# Patient Record
Sex: MALE | Race: WHITE | NOT HISPANIC OR LATINO | Employment: FULL TIME | URBAN - METROPOLITAN AREA
[De-identification: names, ages, dates, MRNs, and addresses within clinical notes are randomized per-mention and may not be internally consistent; named-entity substitution may affect disease eponyms.]

---

## 2020-12-31 PROCEDURE — 99204 OFFICE O/P NEW MOD 45 MIN: CPT | Performed by: EMERGENCY MEDICINE

## 2020-12-31 PROCEDURE — 84550 ASSAY OF BLOOD/URIC ACID: CPT | Performed by: EMERGENCY MEDICINE

## 2021-03-01 ENCOUNTER — APPOINTMENT (OUTPATIENT)
Dept: MRI IMAGING | Facility: HOSPITAL | Age: 46
End: 2021-03-01

## 2021-03-01 ENCOUNTER — HOSPITAL ENCOUNTER (INPATIENT)
Facility: HOSPITAL | Age: 46
LOS: 8 days | Discharge: NURSING, REHAB OR LONG-TERM CARE | End: 2021-03-09
Attending: EMERGENCY MEDICINE | Admitting: HOSPITALIST

## 2021-03-01 ENCOUNTER — APPOINTMENT (OUTPATIENT)
Dept: CT IMAGING | Facility: HOSPITAL | Age: 46
End: 2021-03-01

## 2021-03-01 DIAGNOSIS — M51.26 LUMBAR HERNIATED DISC: ICD-10-CM

## 2021-03-01 DIAGNOSIS — S32.302A CLOSED FRACTURE OF LEFT ILIAC WING, INITIAL ENCOUNTER (HCC): ICD-10-CM

## 2021-03-01 DIAGNOSIS — S32.425A CLOSED NONDISPLACED FRACTURE OF POSTERIOR WALL OF LEFT ACETABULUM, INITIAL ENCOUNTER (HCC): Primary | ICD-10-CM

## 2021-03-01 PROBLEM — E66.9 OBESITY (BMI 30-39.9): Status: ACTIVE | Noted: 2021-03-01

## 2021-03-01 LAB
ALBUMIN SERPL-MCNC: 4.1 G/DL (ref 3.5–5.2)
ALBUMIN/GLOB SERPL: 1.2 G/DL
ALP SERPL-CCNC: 68 U/L (ref 39–117)
ALT SERPL W P-5'-P-CCNC: 33 U/L (ref 1–41)
ANION GAP SERPL CALCULATED.3IONS-SCNC: 9.9 MMOL/L (ref 5–15)
APTT PPP: 31.6 SECONDS (ref 22.7–35.4)
AST SERPL-CCNC: 24 U/L (ref 1–40)
BASOPHILS # BLD AUTO: 0.09 10*3/MM3 (ref 0–0.2)
BASOPHILS NFR BLD AUTO: 0.7 % (ref 0–1.5)
BILIRUB SERPL-MCNC: 0.4 MG/DL (ref 0–1.2)
BUN SERPL-MCNC: 14 MG/DL (ref 6–20)
BUN/CREAT SERPL: 16.5 (ref 7–25)
CALCIUM SPEC-SCNC: 9.3 MG/DL (ref 8.6–10.5)
CHLORIDE SERPL-SCNC: 100 MMOL/L (ref 98–107)
CO2 SERPL-SCNC: 26.1 MMOL/L (ref 22–29)
CREAT SERPL-MCNC: 0.85 MG/DL (ref 0.76–1.27)
DEPRECATED RDW RBC AUTO: 39.4 FL (ref 37–54)
EOSINOPHIL # BLD AUTO: 0.21 10*3/MM3 (ref 0–0.4)
EOSINOPHIL NFR BLD AUTO: 1.7 % (ref 0.3–6.2)
ERYTHROCYTE [DISTWIDTH] IN BLOOD BY AUTOMATED COUNT: 12.9 % (ref 12.3–15.4)
GFR SERPL CREATININE-BSD FRML MDRD: 98 ML/MIN/1.73
GLOBULIN UR ELPH-MCNC: 3.3 GM/DL
GLUCOSE SERPL-MCNC: 77 MG/DL (ref 65–99)
HCT VFR BLD AUTO: 45.1 % (ref 37.5–51)
HGB BLD-MCNC: 14.8 G/DL (ref 13–17.7)
HOLD SPECIMEN: NORMAL
HOLD SPECIMEN: NORMAL
IMM GRANULOCYTES # BLD AUTO: 0.07 10*3/MM3 (ref 0–0.05)
IMM GRANULOCYTES NFR BLD AUTO: 0.6 % (ref 0–0.5)
INR PPP: 1.09 (ref 0.9–1.1)
LYMPHOCYTES # BLD AUTO: 2.97 10*3/MM3 (ref 0.7–3.1)
LYMPHOCYTES NFR BLD AUTO: 24.5 % (ref 19.6–45.3)
MCH RBC QN AUTO: 28.3 PG (ref 26.6–33)
MCHC RBC AUTO-ENTMCNC: 32.8 G/DL (ref 31.5–35.7)
MCV RBC AUTO: 86.2 FL (ref 79–97)
MONOCYTES # BLD AUTO: 0.94 10*3/MM3 (ref 0.1–0.9)
MONOCYTES NFR BLD AUTO: 7.8 % (ref 5–12)
NEUTROPHILS NFR BLD AUTO: 64.7 % (ref 42.7–76)
NEUTROPHILS NFR BLD AUTO: 7.84 10*3/MM3 (ref 1.7–7)
NRBC BLD AUTO-RTO: 0 /100 WBC (ref 0–0.2)
PLATELET # BLD AUTO: 339 10*3/MM3 (ref 140–450)
PMV BLD AUTO: 12.2 FL (ref 6–12)
POTASSIUM SERPL-SCNC: 4.3 MMOL/L (ref 3.5–5.2)
PROT SERPL-MCNC: 7.4 G/DL (ref 6–8.5)
PROTHROMBIN TIME: 13.9 SECONDS (ref 11.7–14.2)
RBC # BLD AUTO: 5.23 10*6/MM3 (ref 4.14–5.8)
SODIUM SERPL-SCNC: 136 MMOL/L (ref 136–145)
WBC # BLD AUTO: 12.12 10*3/MM3 (ref 3.4–10.8)
WHOLE BLOOD HOLD SPECIMEN: NORMAL
WHOLE BLOOD HOLD SPECIMEN: NORMAL

## 2021-03-01 PROCEDURE — 72192 CT PELVIS W/O DYE: CPT

## 2021-03-01 PROCEDURE — U0004 COV-19 TEST NON-CDC HGH THRU: HCPCS | Performed by: EMERGENCY MEDICINE

## 2021-03-01 PROCEDURE — 72131 CT LUMBAR SPINE W/O DYE: CPT

## 2021-03-01 PROCEDURE — 85025 COMPLETE CBC W/AUTO DIFF WBC: CPT | Performed by: EMERGENCY MEDICINE

## 2021-03-01 PROCEDURE — 85610 PROTHROMBIN TIME: CPT | Performed by: EMERGENCY MEDICINE

## 2021-03-01 PROCEDURE — 85730 THROMBOPLASTIN TIME PARTIAL: CPT | Performed by: EMERGENCY MEDICINE

## 2021-03-01 PROCEDURE — 80053 COMPREHEN METABOLIC PANEL: CPT | Performed by: EMERGENCY MEDICINE

## 2021-03-01 PROCEDURE — 99284 EMERGENCY DEPT VISIT MOD MDM: CPT

## 2021-03-01 RX ORDER — MORPHINE SULFATE 2 MG/ML
4 INJECTION, SOLUTION INTRAMUSCULAR; INTRAVENOUS ONCE AS NEEDED
Status: DISCONTINUED | OUTPATIENT
Start: 2021-03-01 | End: 2021-03-02

## 2021-03-01 RX ORDER — ONDANSETRON 2 MG/ML
4 INJECTION INTRAMUSCULAR; INTRAVENOUS EVERY 6 HOURS PRN
Status: DISCONTINUED | OUTPATIENT
Start: 2021-03-01 | End: 2021-03-09 | Stop reason: HOSPADM

## 2021-03-01 RX ORDER — SODIUM CHLORIDE 0.9 % (FLUSH) 0.9 %
10 SYRINGE (ML) INJECTION EVERY 12 HOURS SCHEDULED
Status: DISCONTINUED | OUTPATIENT
Start: 2021-03-01 | End: 2021-03-09 | Stop reason: HOSPADM

## 2021-03-01 RX ORDER — NICOTINE 21 MG/24HR
1 PATCH, TRANSDERMAL 24 HOURS TRANSDERMAL
Status: DISCONTINUED | OUTPATIENT
Start: 2021-03-01 | End: 2021-03-09 | Stop reason: HOSPADM

## 2021-03-01 RX ORDER — ACETAMINOPHEN 160 MG/5ML
650 SOLUTION ORAL EVERY 4 HOURS PRN
Status: DISCONTINUED | OUTPATIENT
Start: 2021-03-01 | End: 2021-03-09 | Stop reason: HOSPADM

## 2021-03-01 RX ORDER — MORPHINE SULFATE 2 MG/ML
4 INJECTION, SOLUTION INTRAMUSCULAR; INTRAVENOUS ONCE
Status: DISCONTINUED | OUTPATIENT
Start: 2021-03-01 | End: 2021-03-02

## 2021-03-01 RX ORDER — ACETAMINOPHEN 325 MG/1
650 TABLET ORAL EVERY 4 HOURS PRN
Status: DISCONTINUED | OUTPATIENT
Start: 2021-03-01 | End: 2021-03-09 | Stop reason: HOSPADM

## 2021-03-01 RX ORDER — ACETAMINOPHEN 500 MG
1000 TABLET ORAL ONCE
Status: COMPLETED | OUTPATIENT
Start: 2021-03-01 | End: 2021-03-01

## 2021-03-01 RX ORDER — ACETAMINOPHEN 650 MG/1
650 SUPPOSITORY RECTAL EVERY 4 HOURS PRN
Status: DISCONTINUED | OUTPATIENT
Start: 2021-03-01 | End: 2021-03-09 | Stop reason: HOSPADM

## 2021-03-01 RX ORDER — SODIUM CHLORIDE 0.9 % (FLUSH) 0.9 %
10 SYRINGE (ML) INJECTION AS NEEDED
Status: DISCONTINUED | OUTPATIENT
Start: 2021-03-01 | End: 2021-03-09 | Stop reason: HOSPADM

## 2021-03-01 RX ADMIN — ACETAMINOPHEN 1000 MG: 500 TABLET ORAL at 18:21

## 2021-03-01 NOTE — ED TRIAGE NOTES
Pt tripped and fell over a piece of concrete while at work. Complains of left lumbar back pain that is worse with movement. Patient masked at arrival and triage staff wore all appropriate PPE during entire encounter with patient.

## 2021-03-01 NOTE — ED NOTES
Pt refusing pain medication at this time. Educated to let RN know if he wants anything for pain.      Olamide Manzano, RN  03/01/21 8986

## 2021-03-01 NOTE — ED PROVIDER NOTES
EMERGENCY DEPARTMENT ENCOUNTER  Patient was placed in face mask in first look and the following protective measures were taken unless additional measures were taken and documented below in the ED course. Patient was wearing facemask when I entered the room and throughout our encounter. I wore full protective equipment throughout this patient encounter including a face mask, and gloves. Hand hygiene was performed before donning protective equipment and after removal when leaving the room.    Room Number:  28/28  Date of encounter:  3/1/2021  PCP: Provider, No Known    HPI:  Context: Festus Milton is a 45 y.o. male who presents to the ED c/o chief complaint of low back pain.  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  Patient reports his left foot got caught on a piece of rebar causing him to spin around and fall landing on the left side of his lower back.  Patient complains of severe sharp left-sided lower back pain with radiation to his anterior hip as well as occasional radiation down to his leg.  Patient complains of some intermittent tingling in his toes, no weakness.  Patient reports afterwards he tried to stand but was unable to, pain is worsened with movement, improved with rest.  Patient reports history of bulging disc in the past with similar radicular pain.  Patient reports prior to fall he was at baseline without complaint.    MEDICAL HISTORY REVIEW  Reviewed in EPIC    PAST MEDICAL HISTORY  Active Ambulatory Problems     Diagnosis Date Noted   • No Active Ambulatory Problems     Resolved Ambulatory Problems     Diagnosis Date Noted   • No Resolved Ambulatory Problems     Past Medical History:   Diagnosis Date   • Allergic    • Injury of back        PAST SURGICAL HISTORY  No past surgical history on file.    FAMILY HISTORY  No family history on file.    SOCIAL HISTORY  Social History     Socioeconomic History   • Marital status:      Spouse  name: Not on file   • Number of children: Not on file   • Years of education: Not on file   • Highest education level: Not on file   Tobacco Use   • Smoking status: Current Every Day Smoker   Substance and Sexual Activity   • Alcohol use: No   • Drug use: No       ALLERGIES  Codeine    The patient's allergies have been reviewed    REVIEW OF SYSTEMS  All systems reviewed and negative except for those discussed in HPI.     PHYSICAL EXAM  I have reviewed the triage vital signs and nursing notes.  ED Triage Vitals   Temp Heart Rate Resp BP SpO2   03/01/21 1637 03/01/21 1636 03/01/21 1636 03/01/21 1636 03/01/21 1636   97.4 °F (36.3 °C) 84 18 126/84 96 %      Temp src Heart Rate Source Patient Position BP Location FiO2 (%)   03/01/21 1637 03/01/21 1636 03/01/21 1636 -- --   Tympanic Monitor Lying         General: No acute distress  HENT: NCAT, PERRL, Nares patent  Eyes: no scleral icterus  Neck: trachea midline, no ROM limitations  CV: regular rhythm, regular rate  Respiratory: normal effort, CTAB  Abdomen: soft, nondistended, nontender to palpation, no rebound tenderness, no guarding or rigidity  : deferred  Musculoskeletal: no deformity  Lumbar spine: No step-offs or deformities, no midline tenderness, left paraspinal left lateral low back tenderness palpation.  Pelvis: Pelvis is grossly stable, patient does have some tenderness the lateral edge of the pelvis with pressure.  Positive tenderness in the left SI joint.  Patient is intolerant of leg movement, unable to assess for strength and reflexes at present other than Achilles reflexes normal, no clonus, negative Babinski.  Neuro: alert, moves all extremities, follows commands  Skin: warm, dry    LAB RESULTS  Recent Results (from the past 24 hour(s))   Light Blue Top    Collection Time: 03/01/21  4:52 PM   Result Value Ref Range    Extra Tube hold for add-on    Green Top (Gel)    Collection Time: 03/01/21  4:52 PM   Result Value Ref Range    Extra Tube Hold for  add-ons.    Lavender Top    Collection Time: 03/01/21  4:52 PM   Result Value Ref Range    Extra Tube hold for add-on    Gold Top - SST    Collection Time: 03/01/21  4:52 PM   Result Value Ref Range    Extra Tube Hold for add-ons.    Comprehensive Metabolic Panel    Collection Time: 03/01/21  4:52 PM    Specimen: Blood   Result Value Ref Range    Glucose 77 65 - 99 mg/dL    BUN 14 6 - 20 mg/dL    Creatinine 0.85 0.76 - 1.27 mg/dL    Sodium 136 136 - 145 mmol/L    Potassium 4.3 3.5 - 5.2 mmol/L    Chloride 100 98 - 107 mmol/L    CO2 26.1 22.0 - 29.0 mmol/L    Calcium 9.3 8.6 - 10.5 mg/dL    Total Protein 7.4 6.0 - 8.5 g/dL    Albumin 4.10 3.50 - 5.20 g/dL    ALT (SGPT) 33 1 - 41 U/L    AST (SGOT) 24 1 - 40 U/L    Alkaline Phosphatase 68 39 - 117 U/L    Total Bilirubin 0.4 0.0 - 1.2 mg/dL    eGFR Non African Amer 98 >60 mL/min/1.73    Globulin 3.3 gm/dL    A/G Ratio 1.2 g/dL    BUN/Creatinine Ratio 16.5 7.0 - 25.0    Anion Gap 9.9 5.0 - 15.0 mmol/L   Protime-INR    Collection Time: 03/01/21  4:52 PM    Specimen: Blood   Result Value Ref Range    Protime 13.9 11.7 - 14.2 Seconds    INR 1.09 0.90 - 1.10   aPTT    Collection Time: 03/01/21  4:52 PM    Specimen: Blood   Result Value Ref Range    PTT 31.6 22.7 - 35.4 seconds   CBC Auto Differential    Collection Time: 03/01/21  4:52 PM    Specimen: Blood   Result Value Ref Range    WBC 12.12 (H) 3.40 - 10.80 10*3/mm3    RBC 5.23 4.14 - 5.80 10*6/mm3    Hemoglobin 14.8 13.0 - 17.7 g/dL    Hematocrit 45.1 37.5 - 51.0 %    MCV 86.2 79.0 - 97.0 fL    MCH 28.3 26.6 - 33.0 pg    MCHC 32.8 31.5 - 35.7 g/dL    RDW 12.9 12.3 - 15.4 %    RDW-SD 39.4 37.0 - 54.0 fl    MPV 12.2 (H) 6.0 - 12.0 fL    Platelets 339 140 - 450 10*3/mm3    Neutrophil % 64.7 42.7 - 76.0 %    Lymphocyte % 24.5 19.6 - 45.3 %    Monocyte % 7.8 5.0 - 12.0 %    Eosinophil % 1.7 0.3 - 6.2 %    Basophil % 0.7 0.0 - 1.5 %    Immature Grans % 0.6 (H) 0.0 - 0.5 %    Neutrophils, Absolute 7.84 (H) 1.70 - 7.00 10*3/mm3     Lymphocytes, Absolute 2.97 0.70 - 3.10 10*3/mm3    Monocytes, Absolute 0.94 (H) 0.10 - 0.90 10*3/mm3    Eosinophils, Absolute 0.21 0.00 - 0.40 10*3/mm3    Basophils, Absolute 0.09 0.00 - 0.20 10*3/mm3    Immature Grans, Absolute 0.07 (H) 0.00 - 0.05 10*3/mm3    nRBC 0.0 0.0 - 0.2 /100 WBC       I ordered the above labs and reviewed the results.    RADIOLOGY  Ct Lumbar Spine Without Contrast    Result Date: 3/1/2021  CT SCAN OF THE LUMBAR SPINE WITHOUT CONTRAST  CLINICAL HISTORY: Fall with left-sided low back pain. Left-sided radicular pain.  TECHNIQUE: CT scan of the lumbar spine was obtained with 1 mm axial images. Sagittal and coronal reconstructed images were obtained.  FINDINGS: The study is somewhat compromised by technical considerations regarding the patient's body habitus.  There is no CT evidence to suggest acute fracture or bony malalignment within the lumbar spine.   At T10-11, T11-12, T12-L1, L1-2, and L2-3, there is no significant acquired canal or foraminal stenosis.  The spinal canal is relatively narrow on a congenital basis with shortened pedicles mildly narrowing the spinal canal from L2-3 down to L4-5.  At L3-4, in addition to the somewhat narrow spinal canal on a congenital basis, there is a disc osteophyte complex which results in a moderate degree of central canal stenosis. There is also a moderate degree of bilateral foraminal narrowing secondary to the disc osteophyte complex.  At L4-5, there is a disc bulge in addition to a left central/left subarticular disc protrusion which is within the position of causing mass effect upon the descending left L5 nerve root. There is mild-to-moderate bilateral foraminal narrowing secondary to bulging disc material.  At L5-S1, there is degenerative retrolisthesis of L5 on S1 by approximately 3 mm. There is mild bilateral foraminal narrowing secondary to bulging disc material.  Incidental note is made of nonobstructing intracalyceal calculi within both  kidneys.      The study is somewhat compromised by technical considerations regarding patient body habitus.  At L4-5, there is a left central/left subarticular disc protrusion which is within the position of causing mass effect upon the descending left L5 nerve root sleeve.  The spinal canal is relatively narrow on a congenital basis with somewhat shortened pedicles contributing to some canal narrowing from L2-3 down to L4-5. Additionally, there is a disc osteophyte complex at the L3-4 level and these findings result in a moderate degree of canal stenosis at L3-4 in addition to moderate bilateral foraminal narrowing at the L3-4 level.  The additional multilevel degenerative phenomena within the lumbar spine are as discussed in detail above. Given the constraints of the quality of the exam due to the patient's body habitus, further evaluation of these abnormalities could be performed with MR imaging for more sensitive and specific assessment of spine pathology. These findings and recommendations were discussed with Min Glynn on 03/01/2021 at approximately 7 PM.  Radiation dose reduction techniques were utilized, including automated exposure control and exposure modulation based on body size.       Ct Pelvis Without Contrast    Result Date: 3/1/2021  CT SCAN OF THE PELVIS WITHOUT CONTRAST  HISTORY: Fell with left-sided pain and tingling.  TECHNIQUE: The CT scan was performed as an emergency procedure through the pelvis with thin axial images combined with coronal and sagittal reconstructions.  FINDINGS: The following findings are present: 1. There appears to be minimal nondisplaced fracture predominately extending through the posterolateral margin of the left acetabulum. There is also an extremely minimal fracture through the posterior margin of the left iliac bone just inferior to the sacroiliac joint. No other fractures are identified. The proximal femurs are intact. 2. The soft tissue structures of the pelvis  appear normal. The urinary bladder has a smooth contour.    Radiation dose reduction techniques were utilized, including automated exposure control and exposure modulation based on body size.  This report was finalized on 3/1/2021 7:24 PM by Dr. Celio Calderon M.D.        I ordered the above noted radiological studies. I reviewed the images and results. I agree with the radiologist interpretation.    PROCEDURES  Procedures    MEDICATIONS GIVEN IN ER  Medications   morphine injection 4 mg (has no administration in time range)   morphine injection 4 mg (has no administration in time range)   acetaminophen (TYLENOL) tablet 1,000 mg (1,000 mg Oral Given 3/1/21 1821)       PROGRESS, DATA ANALYSIS, CONSULTS, AND MEDICAL DECISION MAKING  A complete history and physical exam have been performed.  All available laboratory and imaging results have been reviewed by myself prior to disposition.    MDM  After the initial H&P, I discussed pertinent information from history and physical exam with patient/family.  Discussed differential diagnosis.  Discussed plan for ED evaluation/work-up/treatment.  All questions answered.  Patient/family is agreeable with plan.  ED Course as of Mar 01 2023   Mon Mar 01, 2021   1759 Patient reassessed, patient is refusing any IV pain medication at this time, refuses any narcotics.  Patient is agreeable for Tylenol, will order 1000 mg p.o. Tylenol.    [JG]   0922 Phone call with radiologist.  I had previously reviewed the images. I discussed the patient, imaging, and their interpretation.  CT imaging of pelvis shows a small hairline posterior lateral acetabular fracture as well as a minimal, hairline fracture of the posterior left iliac bone, no sacroiliac involvement.  See dictated report for final interpretation.        [JG]   1900 Phone call with radiologist.  I had previously reviewed the images. I discussed the patient, imaging, and their interpretation.  CT imaging of the spine shows  questionable disc herniation at L4-5.  No fractures or dislocation seen.  See dictated report for final interpretation.        [JG]   1907 Patient reassessed, discussed ED work-up and results to present.  Discussed finding of likely disc herniation as well as pelvic fractures which are likely nonoperative.  Discussed plan for follow-up with neurosurgery and spine.  Discussed with patient that we will need to ambulate him to ensure that he can ambulate prior to discharge.  Patient is agreeable with plan.    [JG]   1908 MACARIO query complete. Treatment plan to include limited course of prescribed  controlled substance. Risks including addiction, benefits, and alternatives presented to patient.       [JG]   1927 Patient attempted to ambulate, even with assistance he was grossly unstable.  Patient was taken back to bed, no fall or trauma.  Patient reassessed, patient is now agreeable for pain medication, will give pain medication and reassess.    [JG]   1932 Phone call with Dr. Ernesto Loyola, orthopedics.  Discussed the patient, relevant history, exam, diagnostics, ED findings/progress, and concerns.  He has reviewed imaging, states that with patient's acetabular fracture he will not be able to bear weight, will be toe-touch weightbearing only.  He recommends admission to hospitalist for pain control, physical therapy, patient will likely need rehab afterwards.        [JG]   1936 Patient reassessed.  Discussed ED findings, differential diagnosis, and the need for admission for evaluation/treatment.  They are agreeable to admission and all questions were answered.        [JG]   1948 Phone call with Dr. Huerta, neurosurgery.  Discussed the patient, relevant history, exam, diagnostics, ED findings/progress, and concerns.  He agrees to consult, request MRI of lumbar spine to be performed.        [JG]   2001 Phone call with DA Alonso for A.  Discussed the patient, relevant history, exam, diagnostics, ED findings/progress,  and concerns. They agree to admit the patient to [facility] under Dr. Guillermo. Care assumed by the admitting physician at this time.        [JG]      ED Course User Index  [JG] Min Glynn MD       AS OF 20:23 EST VITALS:    BP - 137/81  HR - 72  TEMP - 97.4 °F (36.3 °C) (Tympanic)  O2 SATS - 96%    DIAGNOSIS  Final diagnoses:   Closed nondisplaced fracture of posterior wall of left acetabulum, initial encounter (CMS/[facility])   Closed fracture of left iliac wing, initial encounter (CMS/[facility])   Lumbar herniated disc         DISPOSITION  ADMISSION    Discussed treatment plan and reason for admission with pt/family and admitting physician.  Pt/family voiced understanding of the plan for admission for further testing/treatment as needed.          Min Glynn MD  03/01/21 2004       Min Glynn MD  03/01/21 2024

## 2021-03-02 LAB
ANION GAP SERPL CALCULATED.3IONS-SCNC: 10 MMOL/L (ref 5–15)
BUN SERPL-MCNC: 12 MG/DL (ref 6–20)
BUN/CREAT SERPL: 16.2 (ref 7–25)
CALCIUM SPEC-SCNC: 9.1 MG/DL (ref 8.6–10.5)
CHLORIDE SERPL-SCNC: 101 MMOL/L (ref 98–107)
CO2 SERPL-SCNC: 24 MMOL/L (ref 22–29)
CREAT SERPL-MCNC: 0.74 MG/DL (ref 0.76–1.27)
DEPRECATED RDW RBC AUTO: 40.9 FL (ref 37–54)
ERYTHROCYTE [DISTWIDTH] IN BLOOD BY AUTOMATED COUNT: 12.9 % (ref 12.3–15.4)
GFR SERPL CREATININE-BSD FRML MDRD: 115 ML/MIN/1.73
GLUCOSE SERPL-MCNC: 100 MG/DL (ref 65–99)
HCT VFR BLD AUTO: 45.5 % (ref 37.5–51)
HGB BLD-MCNC: 14.2 G/DL (ref 13–17.7)
MCH RBC QN AUTO: 27.4 PG (ref 26.6–33)
MCHC RBC AUTO-ENTMCNC: 31.2 G/DL (ref 31.5–35.7)
MCV RBC AUTO: 87.7 FL (ref 79–97)
PLATELET # BLD AUTO: 339 10*3/MM3 (ref 140–450)
PMV BLD AUTO: 12.2 FL (ref 6–12)
POTASSIUM SERPL-SCNC: 4 MMOL/L (ref 3.5–5.2)
RBC # BLD AUTO: 5.19 10*6/MM3 (ref 4.14–5.8)
SARS-COV-2 ORF1AB RESP QL NAA+PROBE: NOT DETECTED
SODIUM SERPL-SCNC: 135 MMOL/L (ref 136–145)
WBC # BLD AUTO: 9.59 10*3/MM3 (ref 3.4–10.8)

## 2021-03-02 PROCEDURE — 25010000002 ENOXAPARIN PER 10 MG: Performed by: STUDENT IN AN ORGANIZED HEALTH CARE EDUCATION/TRAINING PROGRAM

## 2021-03-02 PROCEDURE — 80048 BASIC METABOLIC PNL TOTAL CA: CPT | Performed by: NURSE PRACTITIONER

## 2021-03-02 PROCEDURE — 85027 COMPLETE CBC AUTOMATED: CPT | Performed by: NURSE PRACTITIONER

## 2021-03-02 PROCEDURE — 97162 PT EVAL MOD COMPLEX 30 MIN: CPT

## 2021-03-02 PROCEDURE — 99222 1ST HOSP IP/OBS MODERATE 55: CPT | Performed by: NURSE PRACTITIONER

## 2021-03-02 PROCEDURE — 97110 THERAPEUTIC EXERCISES: CPT

## 2021-03-02 PROCEDURE — 36415 COLL VENOUS BLD VENIPUNCTURE: CPT | Performed by: NURSE PRACTITIONER

## 2021-03-02 RX ORDER — MORPHINE SULFATE 2 MG/ML
2 INJECTION, SOLUTION INTRAMUSCULAR; INTRAVENOUS
Status: ACTIVE | OUTPATIENT
Start: 2021-03-02 | End: 2021-03-09

## 2021-03-02 RX ORDER — HYDROCODONE BITARTRATE AND ACETAMINOPHEN 5; 325 MG/1; MG/1
1 TABLET ORAL EVERY 4 HOURS PRN
Status: DISCONTINUED | OUTPATIENT
Start: 2021-03-02 | End: 2021-03-03

## 2021-03-02 RX ADMIN — ENOXAPARIN SODIUM 40 MG: 40 INJECTION SUBCUTANEOUS at 10:04

## 2021-03-02 RX ADMIN — SODIUM CHLORIDE, PRESERVATIVE FREE 10 ML: 5 INJECTION INTRAVENOUS at 10:04

## 2021-03-02 RX ADMIN — Medication 1 PATCH: at 00:21

## 2021-03-02 RX ADMIN — HYDROCODONE BITARTRATE AND ACETAMINOPHEN 1 TABLET: 5; 325 TABLET ORAL at 10:04

## 2021-03-02 RX ADMIN — SODIUM CHLORIDE, PRESERVATIVE FREE 10 ML: 5 INJECTION INTRAVENOUS at 20:12

## 2021-03-02 RX ADMIN — HYDROCODONE BITARTRATE AND ACETAMINOPHEN 1 TABLET: 5; 325 TABLET ORAL at 18:54

## 2021-03-02 RX ADMIN — ACETAMINOPHEN 650 MG: 325 TABLET, FILM COATED ORAL at 10:04

## 2021-03-02 RX ADMIN — SODIUM CHLORIDE, PRESERVATIVE FREE 10 ML: 5 INJECTION INTRAVENOUS at 00:22

## 2021-03-02 RX ADMIN — HYDROCODONE BITARTRATE AND ACETAMINOPHEN 1 TABLET: 5; 325 TABLET ORAL at 14:50

## 2021-03-02 RX ADMIN — ENOXAPARIN SODIUM 40 MG: 40 INJECTION SUBCUTANEOUS at 20:12

## 2021-03-02 NOTE — ED NOTES
Pt was able to ambulate 2 steps in room then had to stop because of pain. Able to stand to use urinal.      Olamide Manzano RN  03/01/21 1924

## 2021-03-02 NOTE — PROGRESS NOTES
" LOS: 1 day     Name: Festus Milton  Age: 45 y.o.  Sex: male  :  1975  MRN: [id number]         Primary Care Physician: Provider, No Known    Subjective   Subjective  Complains of pain in his left hip.  Has only taken Tylenol since admission and not currently well controlled.  Has significant pain if he tries to move around in the bed at all.    Objective   Vital Signs  Temp:  [96.8 °F (36 °C)-97.6 °F (36.4 °C)] 96.8 °F (36 °C)  Heart Rate:  [68-84] 74  Resp:  [14-20] 16  BP: (124-161)/(78-97) 139/84  Body mass index is 50.22 kg/m².    Objective:  General Appearance:  Comfortable and in no acute distress.    Vital signs: (most recent): Blood pressure 139/84, pulse 74, temperature 96.8 °F (36 °C), temperature source Oral, resp. rate 16, height 177.8 cm (70\"), weight (!) 159 kg (350 lb), SpO2 95 %.    Lungs:  Normal effort and normal respiratory rate.    Heart: Normal rate.  Regular rhythm.    Abdomen: Abdomen is soft.  Bowel sounds are normal.   There is no abdominal tenderness.     Extremities: There is no dependent edema or local swelling.    Neurological: Patient is alert and oriented to person, place and time.    Skin:  Warm and dry.              Results Review:       I reviewed the patient's new clinical results.    Results from last 7 days   Lab Units 21  0608 21  1652   WBC 10*3/mm3 9.59 12.12*   HEMOGLOBIN g/dL 14.2 14.8   PLATELETS 10*3/mm3 339 339     Results from last 7 days   Lab Units 21  0608 21  1652   SODIUM mmol/L 135* 136   POTASSIUM mmol/L 4.0 4.3   CHLORIDE mmol/L 101 100   CO2 mmol/L 24.0 26.1   BUN mg/dL 12 14   CREATININE mg/dL 0.74* 0.85   CALCIUM mg/dL 9.1 9.3   GLUCOSE mg/dL 100* 77     Results from last 7 days   Lab Units 21  1652   INR  1.09             Scheduled Meds:   enoxaparin, 40 mg, Subcutaneous, Q12H  nicotine, 1 patch, Transdermal, Q24H  sodium chloride, 10 mL, Intravenous, Q12H      PRN Meds:   •  acetaminophen **OR** acetaminophen **OR** " acetaminophen  •  HYDROcodone-acetaminophen  •  Morphine  •  ondansetron  •  Pharmacy to Dose enoxaparin (LOVENOX)  •  sodium chloride  Continuous Infusions:  Pharmacy to Dose enoxaparin (LOVENOX),         Assessment/Plan   Active Hospital Problems    Diagnosis  POA   • **Closed nondisplaced fracture of posterior wall of left acetabulum (CMS/HCC) [S32.425A]  Yes   • Lumbar herniated disc [M51.26]  Yes   • Obesity (BMI 30-39.9) [E66.9]  Yes      Resolved Hospital Problems   No resolved problems to display.       Assessment & Plan    -Orthopedic surgery and neurosurgery have been consulted for recommendations regarding the acetabular fracture and herniated lumbar disc, respectively.  -Pain control with combination of oral and IV.  -Leukocytosis yesterday was reactive and now resolved  -PT. Weightbearing restrictions per surgical services  -Lovenox for DVT prophylaxis    Dispo  To be determined.  CCP to assist.  May need rehab.    I wore full protective equipment throughout the patient encounter including eye protection and facemask.  Hand hygiene was performed before donning protective equipment and after removal when leaving the room.    Jared Rico MD  [facility]  03/02/21  09:48 EST

## 2021-03-02 NOTE — PROGRESS NOTES
"Pharmacy Consult - Lovenox    [facility] has been consulted for pharmacy to dose enoxaparin for VTE prophylaxis per Dr. Guillermo's request.         Relevant clinical data and objective history reviewed:  45 y.o. male 177.8 cm (70\") (!) 159 kg (350 lb)    Home Anticoagulation: None listed on PTA med list    Past Medical History:   Diagnosis Date    Allergic     Injury of back      is allergic to codeine.    Lab Results   Component Value Date    WBC 12.12 (H) 03/01/2021    HGB 14.8 03/01/2021    HCT 45.1 03/01/2021    MCV 86.2 03/01/2021     03/01/2021     Lab Results   Component Value Date    GLUCOSE 77 03/01/2021    CALCIUM 9.3 03/01/2021     03/01/2021    K 4.3 03/01/2021    CO2 26.1 03/01/2021     03/01/2021    BUN 14 03/01/2021    CREATININE 0.85 03/01/2021    EGFRIFNONA 98 03/01/2021    BCR 16.5 03/01/2021    ANIONGAP 9.9 03/01/2021       Estimated Creatinine Clearance: 167.8 mL/min (by C-G formula based on SCr of 0.85 mg/dL).    Active Inpatient Anticoagulation Orders:     Assessment/Plan    Will start patient on Lovenox 40 mg subcutaneous every 12 hours, adjusted for renal function, BMI>40kg/m2. Labs to be ordered: BMP/CBC in AM. Pharmacy will continue to follow.     Kenn Milton, [facility]    "

## 2021-03-02 NOTE — PROGRESS NOTES
Discharge Planning Assessment  [facility]     Patient Name: Festus Milton  MRN: [id number]  Today's Date: 3/2/2021    Admit Date: 3/1/2021    Discharge Needs Assessment     Row Name 03/02/21 0903       Living Environment    Lives With  spouse    Current Living Arrangements  home/apartment/condo    Potentially Unsafe Housing Conditions  other (see comments) no concerns    Primary Care Provided by  self    Provides Primary Care For  no one    Family Caregiver if Needed  spouse    Quality of Family Relationships  helpful;involved;supportive       Resource/Environmental Concerns    Resource/Environmental Concerns  none    Transportation Concerns  car, none       Transition Planning    Patient/Family Anticipates Transition to  inpatient rehabilitation facility    Patient/Family Anticipated Services at Transition  rehabilitation services    Transportation Anticipated  family or friend will provide       Discharge Needs Assessment    Readmission Within the Last 30 Days  no previous admission in last 30 days    Current Outpatient/Agency/Support Group  skilled nursing facility    Equipment Currently Used at Home  none    Concerns to be Addressed  discharge planning    Anticipated Changes Related to Illness  none    Equipment Needed After Discharge  none    Outpatient/Agency/Support Group Needs  skilled nursing facility    Discharge Facility/Level of Care Needs  nursing facility, skilled        Discharge Plan     Row Name 03/02/21 0903       Plan    Plan  Pending SNF referrals    Patient/Family in Agreement with Plan  yes    Plan Comments  CCP met with patient at bedside. CCP role explained and discharge planning discussed. Face sheet verified. Patient lives with his spouse, with two steps to the entrance of the home. Patient’s bedroom is upstairs (13 steps-handrails present). Patient does have a pull out couch on the main level he could utilize if needed. Patient is independent with his ADLs prior to admission. Patient has  not been to SNF or used home health. Patient’s preferred [facility] is [facility] on [address]. CCP discussed possible SNF at discharge. CCP reviewed SNF list with patient, patient agreeable to referrals to [facility]. CCP provided Medicare quality ratings to patient. CCP will follow for PT eval and follow for pending SNF referrals. Gloria VALENTIN        Continued Care and Services - Admitted Since 3/1/2021    Coordination has not been started for this encounter.         Demographic Summary     Row Name 03/02/21 0902       General Information    Admission Type  inpatient    Arrived From  emergency department    Referral Source  admission list    Reason for Consult  discharge planning    Preferred Language  English     Used During This Interaction  no        Functional Status     Row Name 03/02/21 0902       Functional Status    Usual Activity Tolerance  good    Current Activity Tolerance  fair       Functional Status, IADL    Medications  independent    Meal Preparation  independent    Housekeeping  independent    Laundry  independent    Shopping  independent       Mental Status    General Appearance WDL  WDL       Mental Status Summary    Recent Changes in Mental Status/Cognitive Functioning  no changes       Employment/    Employment Status  employed full-time        Psychosocial    No documentation.       Abuse/Neglect    No documentation.       Legal    No documentation.       Substance Abuse    No documentation.       Patient Forms    No documentation.           BARBIE Coello

## 2021-03-02 NOTE — PLAN OF CARE
Goal Outcome Evaluation:  Plan of Care Reviewed With: patient     Outcome Summary: Pt. is a 45 year old Male admitted to the hospital after a fall sustaining a Left Acetabular fx/Left Posterior Iliac fx. Pt. is now TTWB LLE per MD orders.  Pt. reports that prior to admission he was independent with functional mobility and no use of an A.D. during ambulation. Pt. currently presents with decreased balance and decreased tolerance to functional activity. Pt. will benefit from skilled inpt. P.T. to address his functional deficits and to assist pt. in regaining his maximum level of independence with functional mobility.    Patient was wearing a face mask during this therapy encounter. Therapist used appropriate personal protective equipment including eye protection, mask, and gloves.  Mask used was standard procedure mask. Appropriate PPE was worn during the entire therapy session. Hand hygiene was completed before and after therapy session. Patient is not in enhanced droplet precautions.

## 2021-03-02 NOTE — CONSULTS
"  Orthopaedic Surgery  Consult Note  Dr. TORREY Meneses” Nir II  [phone]    HPI:  Patient is a 45 y.o. Not  or  male who presents with left hip pain after a mechanical fall while at work.  He presented to the hospital where a CT scan demonstrated a left nondisplaced posterior wall acetabular fracture.  He was made toe-touch weightbearing and admitted for pain control and mobilization.  Patient has a BMI over 50 and this does complicate his mobilization efforts.  He complains of sharp pains with any attempted range of motion or weight on the left leg.    MEDICAL HISTORY  Past Medical History:   Diagnosis Date   • Allergic    • Injury of back    ·   · No past surgical history on file.  Prior to Admission medications    Not on File   ·   Allergies   Allergen Reactions   • Codeine Provider Review Needed   ·   ·   · There is no immunization history on file for this patient.  Social History     Tobacco Use   • Smoking status: Current Every Day Smoker   Substance Use Topics   • Alcohol use: No   ·    Social History     Substance and Sexual Activity   Drug Use No   ·     VITALS: /84 (BP Location: Left arm, Patient Position: Lying)   Pulse 74   Temp 96.8 °F (36 °C) (Oral)   Resp 16   Ht 177.8 cm (70\")   Wt (!) 159 kg (350 lb)   SpO2 95%   BMI 50.22 kg/m²  Body mass index is 50.22 kg/m².    PHYSICAL EXAM:   · CONSTITUTIONAL: No acute distress  · LUNGS: Equal chest rise, no shortness of air  · CARDIOVASCULAR: palpable peripheral pulses  · SKIN: no skin lesions in the area examined  · LYMPH: no lymphadenopathy in the area examined  · EXTREMITY: Left Lower Extremity  · Tenderness to Palpation: Pain with passive leg roll  · Gross Deformity: No Gross Deformity  · Pulses:  Brisk Capillary Refill  · Sensation: Intact to Saphenous, Sural, Deep Peroneal, Superficial Peroneal, and Tibial Nerves and grossly throughout extremity  · Motor: 5/5 EHL/FHL/TA/GS motor complexes    RADIOLOGY REVIEW:   Ct Lumbar Spine " Without Contrast    Result Date: 3/2/2021  The study is somewhat compromised by technical considerations regarding patient body habitus.  At L4-5, there is a left central/left subarticular disc protrusion which is within the position of causing mass effect upon the descending left L5 nerve root sleeve.  The spinal canal is relatively narrow on a congenital basis with somewhat shortened pedicles contributing to some canal narrowing from L2-3 down to L4-5. Additionally, there is a disc osteophyte complex at the L3-4 level and these findings result in a moderate degree of canal stenosis at L3-4 in addition to moderate bilateral foraminal narrowing at the L3-4 level.  The additional multilevel degenerative phenomena within the lumbar spine are as discussed in detail above. Given the constraints of the quality of the exam due to the patient's body habitus, further evaluation of these abnormalities could be performed with MR imaging for more sensitive and specific assessment of spine pathology. These findings and recommendations were discussed with Min Glynn on 03/01/2021 at approximately 7 PM.  Radiation dose reduction techniques were utilized, including automated exposure control and exposure modulation based on body size.  This report was finalized on 3/2/2021 6:20 AM by Dr. Lawrence Latham M.D.        LABS:   Results for the past 24 hours:   Recent Results (from the past 24 hour(s))   Light Blue Top    Collection Time: 03/01/21  4:52 PM   Result Value Ref Range    Extra Tube hold for add-on    Green Top (Gel)    Collection Time: 03/01/21  4:52 PM   Result Value Ref Range    Extra Tube Hold for add-ons.    Lavender Top    Collection Time: 03/01/21  4:52 PM   Result Value Ref Range    Extra Tube hold for add-on    Gold Top - SST    Collection Time: 03/01/21  4:52 PM   Result Value Ref Range    Extra Tube Hold for add-ons.    Comprehensive Metabolic Panel    Collection Time: 03/01/21  4:52 PM    Specimen: Blood      Result Value Ref Range    Glucose 77 65 - 99 mg/dL    BUN 14 6 - 20 mg/dL    Creatinine 0.85 0.76 - 1.27 mg/dL    Sodium 136 136 - 145 mmol/L    Potassium 4.3 3.5 - 5.2 mmol/L    Chloride 100 98 - 107 mmol/L    CO2 26.1 22.0 - 29.0 mmol/L    Calcium 9.3 8.6 - 10.5 mg/dL    Total Protein 7.4 6.0 - 8.5 g/dL    Albumin 4.10 3.50 - 5.20 g/dL    ALT (SGPT) 33 1 - 41 U/L    AST (SGOT) 24 1 - 40 U/L    Alkaline Phosphatase 68 39 - 117 U/L    Total Bilirubin 0.4 0.0 - 1.2 mg/dL    eGFR Non African Amer 98 >60 mL/min/1.73    Globulin 3.3 gm/dL    A/G Ratio 1.2 g/dL    BUN/Creatinine Ratio 16.5 7.0 - 25.0    Anion Gap 9.9 5.0 - 15.0 mmol/L   Protime-INR    Collection Time: 03/01/21  4:52 PM    Specimen: Blood   Result Value Ref Range    Protime 13.9 11.7 - 14.2 Seconds    INR 1.09 0.90 - 1.10   aPTT    Collection Time: 03/01/21  4:52 PM    Specimen: Blood   Result Value Ref Range    PTT 31.6 22.7 - 35.4 seconds   CBC Auto Differential    Collection Time: 03/01/21  4:52 PM    Specimen: Blood   Result Value Ref Range    WBC 12.12 (H) 3.40 - 10.80 10*3/mm3    RBC 5.23 4.14 - 5.80 10*6/mm3    Hemoglobin 14.8 13.0 - 17.7 g/dL    Hematocrit 45.1 37.5 - 51.0 %    MCV 86.2 79.0 - 97.0 fL    MCH 28.3 26.6 - 33.0 pg    MCHC 32.8 31.5 - 35.7 g/dL    RDW 12.9 12.3 - 15.4 %    RDW-SD 39.4 37.0 - 54.0 fl    MPV 12.2 (H) 6.0 - 12.0 fL    Platelets 339 140 - 450 10*3/mm3    Neutrophil % 64.7 42.7 - 76.0 %    Lymphocyte % 24.5 19.6 - 45.3 %    Monocyte % 7.8 5.0 - 12.0 %    Eosinophil % 1.7 0.3 - 6.2 %    Basophil % 0.7 0.0 - 1.5 %    Immature Grans % 0.6 (H) 0.0 - 0.5 %    Neutrophils, Absolute 7.84 (H) 1.70 - 7.00 10*3/mm3    Lymphocytes, Absolute 2.97 0.70 - 3.10 10*3/mm3    Monocytes, Absolute 0.94 (H) 0.10 - 0.90 10*3/mm3    Eosinophils, Absolute 0.21 0.00 - 0.40 10*3/mm3    Basophils, Absolute 0.09 0.00 - 0.20 10*3/mm3    Immature Grans, Absolute 0.07 (H) 0.00 - 0.05 10*3/mm3    nRBC 0.0 0.0 - 0.2 /100 WBC   COVID-19,[facility]  [facility], NP/OP SWAB IN UTM/VTM/[facility] TRANSPORT MEDIA,24 HR TAT - Swab, Nasopharynx    Collection Time: 03/01/21  7:58 PM    Specimen: Nasopharynx; Swab   Result Value Ref Range    COVID19 Not Detected Not Detected - Ref. Range   Basic Metabolic Panel    Collection Time: 03/02/21  6:08 AM    Specimen: Arm, Left; Blood   Result Value Ref Range    Glucose 100 (H) 65 - 99 mg/dL    BUN 12 6 - 20 mg/dL    Creatinine 0.74 (L) 0.76 - 1.27 mg/dL    Sodium 135 (L) 136 - 145 mmol/L    Potassium 4.0 3.5 - 5.2 mmol/L    Chloride 101 98 - 107 mmol/L    CO2 24.0 22.0 - 29.0 mmol/L    Calcium 9.1 8.6 - 10.5 mg/dL    eGFR Non African Amer 115 >60 mL/min/1.73    BUN/Creatinine Ratio 16.2 7.0 - 25.0    Anion Gap 10.0 5.0 - 15.0 mmol/L   CBC (No Diff)    Collection Time: 03/02/21  6:08 AM    Specimen: Arm, Left; Blood   Result Value Ref Range    WBC 9.59 3.40 - 10.80 10*3/mm3    RBC 5.19 4.14 - 5.80 10*6/mm3    Hemoglobin 14.2 13.0 - 17.7 g/dL    Hematocrit 45.5 37.5 - 51.0 %    MCV 87.7 79.0 - 97.0 fL    MCH 27.4 26.6 - 33.0 pg    MCHC 31.2 (L) 31.5 - 35.7 g/dL    RDW 12.9 12.3 - 15.4 %    RDW-SD 40.9 37.0 - 54.0 fl    MPV 12.2 (H) 6.0 - 12.0 fL    Platelets 339 140 - 450 10*3/mm3       IMPRESSION:  Patient is a 45 y.o. Not  or  male with left posterior wall acetabular fracture, nondisplaced    PLAN:   · Admited to: Jake Guillermo MD  · Disposition: Plan nonoperative treatment of this injury.  Toe-touch weightbearing for 6 weeks followed by partial weightbearing for 6 weeks.  Physical therapy for mobilization.  Based on his BMI he very well may need rehab placement.  We will see how he does with therapy first.    R \"Ernesto\" Nir FERNANDEZ MD  Orthopaedic Surgery  [facility]  [phone] - [place] [facility]  [phone] - [place] [facility]            "

## 2021-03-02 NOTE — PROGRESS NOTES
"Pharmacy to dose Lovenox   Dx: Pt admitted with acetabular fracture  Indication: VTE prophylaxis  Requesting MD: Dr. Guillermo  Current dose: 40 mg BID    Relevant data:  Blood pressure 126/90, pulse 78, temperature 97.3 °F (36.3 °C), temperature source Oral, resp. rate 16, height 177.8 cm (70\"), weight (!) 159 kg (350 lb), SpO2 96 %.    45 y.o. male Body mass index is 50.22 kg/m².    Results from last 7 days   Lab Units 03/02/21  0608 03/01/21  1652   CREATININE mg/dL 0.74* 0.85       Results from last 7 days   Lab Units 03/02/21  0608 03/01/21  1652   WBC 10*3/mm3 9.59 12.12*   HEMOGLOBIN g/dL 14.2 14.8   HEMATOCRIT % 45.5 45.1   PLATELETS 10*3/mm3 339 339       Lab Results   Component Value Date    INR 1.09 03/01/2021       Assessment:  Pt appropriately dosed on 40 mg Q12 for VTE prophylaxis in a patient with BMI >40. Plt, Scr and Hbg stable. Will sign off at this time given that no further adjustments in dosing are expected. Pharmacy will continue to review as part of clinical program, however do not hesitate to reach out for further questions or assistance if needed.     Sydni Noyola, Pharm.D., [facility]  Oncology Pharmacy Specialist  [phone]        "

## 2021-03-02 NOTE — PROGRESS NOTES
Continued Stay Note  [facility]     Patient Name: Festus Milton  MRN: [id number]  Today's Date: 3/2/2021    Admit Date: 3/1/2021    Discharge Plan     Row Name 03/02/21 0944       Plan    Plan  Pending SNF referrals    Plan Comments  CCP provided SNF medicare ratings; patient agreeable to CCP making referrals to facilities that may work with Worker's comp and have three star rating. Additional referrals placed in [facility]. Gloria VALENTIN    Row Name 03/02/21 0903       Plan    Plan  Pending SNF referrals    Patient/Family in Agreement with Plan  yes    Plan Comments  CCP met with patient at bedside. CCP role explained and discharge planning discussed. Face sheet verified. Patient lives with his spouse, with two steps to the entrance of the home. Patient’s bedroom is upstairs (13 steps-handrails present). Patient does have a pull out couch on the main level he could utilize if needed. Patient is independent with his ADLs prior to admission. Patient has not been to SNF or used home health. Patient’s preferred pharmacy is [facility] on [facility]. CCP discussed possible SNF at discharge. CCP reviewed SNF list with patient, patient agreeable to referrals to [facility] and [facility]. CCP provided Medicare quality ratings to patient. CCP will follow for PT eval and follow for pending SNF referrals. Gloria VALENTIN        Discharge Codes    No documentation.             BARBIE Coello

## 2021-03-02 NOTE — THERAPY EVALUATION
Patient Name: Festus Milton  : 1975    MRN: [id number]                              Today's Date: 3/2/2021       Admit Date: 3/1/2021    Visit Dx:     ICD-10-CM ICD-9-CM   1. Closed nondisplaced fracture of posterior wall of left acetabulum, initial encounter (CMS/[facility])  S32.425A 808.0   2. Closed fracture of left iliac wing, initial encounter (CMS/[facility])  S32.302A 808.41   3. Lumbar herniated disc  M51.26 722.10     Patient Active Problem List   Diagnosis   • Closed nondisplaced fracture of posterior wall of left acetabulum (CMS/[facility])   • Lumbar herniated disc   • Obesity (BMI 30-39.9)     Past Medical History:   Diagnosis Date   • Allergic    • Injury of back      No past surgical history on file.  General Information     Row Name 21 0953          Physical Therapy Time and Intention    Document Type  evaluation Pt. admitted after a fall sustaining a Left acetabular fx/Left Posterior Iliac fx.  -MS     Mode of Treatment  physical therapy;individual therapy  -MS     Row Name 21 0053          General Information    Patient Profile Reviewed  yes  -MS     Prior Level of Function  independent:  -MS     Existing Precautions/Restrictions  (S) fall;weight bearing TTWB LLE per MD orders  -MS     Barriers to Rehab  none identified  -MS     Row Name 2153          Cognition    Orientation Status (Cognition)  oriented x 3  -MS     Row Name 2153          Safety Issues, Functional Mobility    Comment, Safety Issues/Impairments (Mobility)  Gait belt used for safety.  -MS       User Key  (r) = Recorded By, (t) = Taken By, (c) = Cosigned By    Initials Name Provider Type    MS Jude Guillermo, PT Physical Therapist        Mobility     Row Name 21 0954          Bed Mobility    Bed Mobility  supine-sit;sit-supine  -MS     Supine-Sit [place] (Bed Mobility)  minimum assist (75% patient effort)  -MS     Row Name 21 0954          Transfers    Comment (Transfers)  Pt. performed sit <->  stand transfers x 3 reps for functional strength training.  -MS     Row Name 03/02/21 0954          Sit-Stand Transfer    Sit-Stand [place] (Transfers)  minimum assist (75% patient effort);2 person assist  -MS     Assistive Device (Sit-Stand Transfers)  walker, front-wheeled  -MS     Row Name 03/02/21 0954          Gait/Stairs (Locomotion)    [place] Level (Gait)  minimum assist (75% patient effort);2 person assist  -MS     Assistive Device (Gait)  walker, front-wheeled  -MS     Distance in Feet (Gait)  6 feet  -MS     Deviations/Abnormal Patterns (Gait)  antalgic;enoc decreased  -MS     Bilateral Gait Deviations  forward flexed posture  -MS     Comment (Gait/Stairs)  Pt. able to maintain TTWB LLE throughout upright mobility.  -MS     Row Name 03/02/21 0954          Mobility    Extremity Weight-bearing Status  (S) left lower extremity  -MS     Left Lower Extremity (Weight-bearing Status)  (S) toe touch weight-bearing (TTWB)  -MS       User Key  (r) = Recorded By, (t) = Taken By, (c) = Cosigned By    Initials Name Provider Type    Jude Cohen, PT Physical Therapist        Obj/Interventions     Row Name 03/02/21 0956          Range of Motion Comprehensive    Comment, General Range of Motion  BUE/RLE (WFL's)  -MS     Row Name 03/02/21 0956          Strength Comprehensive (MMT)    Comment, General Manual Muscle Testing (MMT) Assessment  BUE (4-/5); RLE (>/= 3/5)  -MS       User Key  (r) = Recorded By, (t) = Taken By, (c) = Cosigned By    Initials Name Provider Type    Jude Cohen, PT Physical Therapist        Goals/Plan     Row Name 03/02/21 0958          Bed Mobility Goal 1 (PT)    Activity/Assistive Device (Bed Mobility Goal 1, PT)  bed mobility activities, all  -MS     [place] Level/Cues Needed (Bed Mobility Goal 1, PT)  standby assist  -MS     Time Frame (Bed Mobility Goal 1, PT)  long term goal (LTG);1 week  -MS     Row Name 03/02/21 0958          Transfer Goal 1 (PT)     Activity/Assistive Device (Transfer Goal 1, PT)  transfers, all;walker, rolling  -MS     [place] Level/Cues Needed (Transfer Goal 1, PT)  standby assist  -MS     Time Frame (Transfer Goal 1, PT)  long term goal (LTG);1 week  -MS     Row Name 03/02/21 0958          Gait Training Goal 1 (PT)    Activity/Assistive Device (Gait Training Goal 1, PT)  gait (walking locomotion);walker, rolling  -MS     [place] Level (Gait Training Goal 1, PT)  standby assist  -MS     Distance (Gait Training Goal 1, PT)  20 feet  -MS     Time Frame (Gait Training Goal 1, PT)  long term goal (LTG);1 week  -MS       User Key  (r) = Recorded By, (t) = Taken By, (c) = Cosigned By    Initials Name Provider Type    Jude Cohen, PT Physical Therapist        Clinical Impression     Row Name 03/02/21 0957          Pain    Additional Documentation  Pain Scale: Numbers Pre/Post-Treatment (Group)  -MS     Row Name 03/02/21 0957          Pain Scale: Numbers Pre/Post-Treatment    Pretreatment Pain Rating  8/10  -MS     Posttreatment Pain Rating  7/10  -MS     Pain Location - Side  Left  -MS     Pain Location  hip  -MS     Pain Intervention(s)  Medication (See MAR);Rest;Repositioned  -MS     Row Name 03/02/21 0957          Plan of Care Review    Plan of Care Reviewed With  patient  -MS     Row Name 03/02/21 0957          Therapy Assessment/Plan (PT)    Rehab Potential (PT)  good, to achieve stated therapy goals  -MS     Criteria for Skilled Interventions Met (PT)  skilled treatment is necessary  -MS     Row Name 03/02/21 0957          Positioning and Restraints    Pre-Treatment Position  in bed  -MS     Post Treatment Position  chair  -MS     In Chair  notified nsg;sitting;call light within reach;encouraged to call for assist;with nsg All lines intact.  -MS       User Key  (r) = Recorded By, (t) = Taken By, (c) = Cosigned By    Initials Name Provider Type    Jude Cohen, PT Physical Therapist        Outcome Measures     Row  Name 03/02/21 0959          How much help from another person do you currently need...    Turning from your back to your side while in flat bed without using bedrails?  3  -MS     Moving from lying on back to sitting on the side of a flat bed without bedrails?  3  -MS     Moving to and from a bed to a chair (including a wheelchair)?  2  -MS     Standing up from a chair using your arms (e.g., wheelchair, bedside chair)?  2  -MS     Climbing 3-5 steps with a railing?  2  -MS     To walk in hospital room?  2  -MS     AM-PAC 6 Clicks Score (PT)  14  -MS     Row Name 03/02/21 0959          Functional Assessment    Outcome Measure Options  AM-PAC 6 Clicks Basic Mobility (PT)  -MS       User Key  (r) = Recorded By, (t) = Taken By, (c) = Cosigned By    Initials Name Provider Type    Jude Cohen, PT Physical Therapist        Physical Therapy Education                 Title: PT OT SLP Therapies (Done)     Topic: Physical Therapy (Done)     Point: Mobility training (Done)     Learning Progress Summary           Patient Acceptance, E,D, VU,NR by MS at 3/2/2021 0959                   Point: Home exercise program (Done)     Learning Progress Summary           Patient Acceptance, E,D, VU,NR by MS at 3/2/2021 0959                   Point: Body mechanics (Done)     Learning Progress Summary           Patient Acceptance, E,D, VU,NR by MS at 3/2/2021 0959                   Point: Precautions (Done)     Learning Progress Summary           Patient Acceptance, E,D, VU,NR by MS at 3/2/2021 0959                               User Key     Initials Effective Dates Name Provider Type Discipline    MS 04/03/18 -  Jude Guillermo PT Physical Therapist PT              PT Recommendation and Plan  Planned Therapy Interventions (PT): balance training, bed mobility training, gait training, home exercise program, patient/family education, postural re-education, transfer training, stair training  Plan of Care Reviewed With:  patient  Outcome Summary: Pt. is a 45 year old Male admitted to the hospital after a fall sustaining a Left Acetabular fx/Left Posterior Iliac fx. Pt. is now TTWB LLE per MD orders.  Pt. reports that prior to admission he was independent with functional mobility and no use of an A.D. during ambulation. Pt. currently presents with decreased balance and decreased tolerance to functional activity. Pt. will benefit from skilled inpt. P.T. to address his functional deficits and to assist pt. in regaining his maximum level of independence with functional mobility.     Time Calculation:   PT Charges     Row Name 03/02/21 1002             Time Calculation    Start Time  0900  -MS      Stop Time  0919  -MS      Time Calculation (min)  19 min  -MS      PT Received On  03/02/21  -MS      PT - Next Appointment  03/03/21  -MS      PT Goal Re-Cert Due Date  03/09/21  -MS         Time Calculation- PT    Total Timed Code Minutes- PT  18 minute(s)  -MS        User Key  (r) = Recorded By, (t) = Taken By, (c) = Cosigned By    Initials Name Provider Type    Jude Cohen, PT Physical Therapist        Therapy Charges for Today     Code Description Service Date Service Provider Modifiers Qty    73162935704 HC PT EVAL MOD COMPLEXITY 2 3/2/2021 Jude Guillermo, PT GP 1    63256126787 HC PT THER PROC EA 15 MIN 3/2/2021 Jude Guillermo, PT GP 1    18126874683 HC PT THER SUPP EA 15 MIN 3/2/2021 Jude Guillermo, PT GP 1          PT G-Codes  Outcome Measure Options: AM-PAC 6 Clicks Basic Mobility (PT)  AM-PAC 6 Clicks Score (PT): 14    Jude Guillermo PT  3/2/2021

## 2021-03-02 NOTE — H&P
"    Patient Name:  Festus Milton  YOB: 1975  MRN:  [id number]  Admit Date:  3/1/2021  Patient Care Team:  Provider, Cate Known as PCP - General      Chief Complaint   Patient presents with   • Fall       Subjective     Mr. Milton is a 45 y.o. male smoker with reported history of \"mild case of Crohn's disease\" on no biologic that presents to [facility] complaining of left sided lower back/hip pain. Patient reports that he was at work when he got caught on a piece of rebar causing him to spin and fall to the ground on his left side. He was able to catch himself with his hand before he fell further, denies hitting head or loss of consciousness. Patient reports that he laid on the ground for about 10 minutes due to the pain, but was finally able to have help getting up when a co-worker used a front  to help him back to his car. Unable to stand due to the pain, EMS was called and he was brought here. Patient reports constant sharp pain to his left side, radiates intermittently down his left leg and also reports intermittent left foot tingling. Patient denies fever, chest pain, shortness of breath, abdominal pain, changes in bowel or bladder habits, edema. Labs tonight were unremarkable with exception of slight leukocytosis but CT lumbar spine shows disc herniation at L4-5 and CT pelvis showed minimal nondisplaced fracture of left acetabulum and extremely minimal fracture to the left iliac bone. Patient was unable to ambulate due to pain, even after dose of IV Morphine. Ortho and [facility] were consulted, MRI of lumbar spine was recommended per [facility], and this was ordered while in ED.     History of Present Illness    Past Medical History:   Diagnosis Date   • Allergic    • Injury of back      No past surgical history on file.  No family history on file.  Social History     Tobacco Use   • Smoking status: Current Every Day Smoker   Substance Use Topics   • Alcohol use: No   • Drug use: No     (Not " in a hospital admission)    Allergies:    Allergies   Allergen Reactions   • Codeine Provider Review Needed       Review of Systems   Constitutional: Negative.  Negative for chills and fever.   HENT: Negative.  Negative for congestion and sore throat.    Eyes: Negative.  Negative for visual disturbance.   Respiratory: Negative.  Negative for cough and shortness of breath.    Cardiovascular: Negative.  Negative for chest pain and leg swelling.   Gastrointestinal: Negative.  Negative for abdominal pain, nausea and vomiting.   Endocrine: Negative.    Genitourinary: Negative.  Negative for dysuria, frequency and urgency.   Musculoskeletal: Positive for arthralgias (left hip) and back pain (left lower back). Negative for myalgias.   Skin: Negative.  Negative for color change and pallor.   Allergic/Immunologic: Negative.    Neurological: Negative for dizziness, weakness, numbness and headaches.        Occasional tingling to left foot   Hematological: Negative.    Psychiatric/Behavioral: Negative.  Negative for agitation, behavioral problems and confusion.        Objective    Vital Signs  Temp:  [97.4 °F (36.3 °C)] 97.4 °F (36.3 °C)  Heart Rate:  [72-84] 72  Resp:  [14-18] 14  BP: (124-150)/(78-84) 150/78  SpO2:  [96 %-99 %] 99 %  on   ;   Device (Oxygen Therapy): room air  Body mass index is 50.22 kg/m².    Physical Exam  Vitals signs and nursing note reviewed.   Constitutional:       General: He is not in acute distress.     Appearance: Normal appearance. He is obese.   HENT:      Head: Normocephalic and atraumatic.      Mouth/Throat:      Mouth: Mucous membranes are moist.   Eyes:      Extraocular Movements: Extraocular movements intact.   Neck:      Musculoskeletal: Normal range of motion and neck supple.   Cardiovascular:      Rate and Rhythm: Normal rate and regular rhythm.      Pulses: Normal pulses.      Heart sounds: Normal heart sounds.   Pulmonary:      Effort: Pulmonary effort is normal. No respiratory  distress.      Breath sounds: Normal breath sounds.   Abdominal:      General: Bowel sounds are normal.      Palpations: Abdomen is soft.      Comments: Obese   Musculoskeletal:         General: Tenderness (left lower back/side/hip) present. No swelling.      Comments: Decreased ROM LLE   Skin:     General: Skin is warm and dry.   Neurological:      General: No focal deficit present.      Mental Status: He is alert and oriented to person, place, and time. Mental status is at baseline.   Psychiatric:         Mood and Affect: Mood normal.         Behavior: Behavior normal.         Thought Content: Thought content normal.         Judgment: Judgment normal.         Results Review:   I reviewed the patient's new clinical results including all labs and xrays.    Lab Results (last 24 hours)     Procedure Component Value Units Date/Time    CBC & Differential [[id number]]  (Abnormal) Collected: 03/01/21 1652    Specimen: Blood Updated: 03/01/21 1949    Narrative:      The following orders were created for panel order CBC & Differential.  Procedure                               Abnormality         Status                     ---------                               -----------         ------                     CBC Auto Differential[[id number]]        Abnormal            Final result                 Please view results for these tests on the individual orders.    Comprehensive Metabolic Panel [[id number]] Collected: 03/01/21 1652    Specimen: Blood Updated: 03/01/21 1950     Glucose 77 mg/dL      BUN 14 mg/dL      Creatinine 0.85 mg/dL      Sodium 136 mmol/L      Potassium 4.3 mmol/L      Chloride 100 mmol/L      CO2 26.1 mmol/L      Calcium 9.3 mg/dL      Total Protein 7.4 g/dL      Albumin 4.10 g/dL      ALT (SGPT) 33 U/L      AST (SGOT) 24 U/L      Alkaline Phosphatase 68 U/L      Total Bilirubin 0.4 mg/dL      eGFR Non African Amer 98 mL/min/1.73      Globulin 3.3 gm/dL      A/G Ratio 1.2 g/dL      BUN/Creatinine Ratio 16.5      Anion Gap 9.9 mmol/L     Narrative:      GFR Normal >60  Chronic Kidney Disease <60  Kidney Failure <15      Protime-INR [[id number]]  (Normal) Collected: 03/01/21 1652    Specimen: Blood Updated: 03/01/21 1948     Protime 13.9 Seconds      INR 1.09    aPTT [[id number]]  (Normal) Collected: 03/01/21 1652    Specimen: Blood Updated: 03/01/21 1948     PTT 31.6 seconds     CBC Auto Differential [[id number]]  (Abnormal) Collected: 03/01/21 1652    Specimen: Blood Updated: 03/01/21 1949     WBC 12.12 10*3/mm3      RBC 5.23 10*6/mm3      Hemoglobin 14.8 g/dL      Hematocrit 45.1 %      MCV 86.2 fL      MCH 28.3 pg      MCHC 32.8 g/dL      RDW 12.9 %      RDW-SD 39.4 fl      MPV 12.2 fL      Platelets 339 10*3/mm3      Neutrophil % 64.7 %      Lymphocyte % 24.5 %      Monocyte % 7.8 %      Eosinophil % 1.7 %      Basophil % 0.7 %      Immature Grans % 0.6 %      Neutrophils, Absolute 7.84 10*3/mm3      Lymphocytes, Absolute 2.97 10*3/mm3      Monocytes, Absolute 0.94 10*3/mm3      Eosinophils, Absolute 0.21 10*3/mm3      Basophils, Absolute 0.09 10*3/mm3      Immature Grans, Absolute 0.07 10*3/mm3      nRBC 0.0 /100 WBC     COVID PRE-OP / PRE-PROCEDURE SCREENING ORDER (NO ISOLATION) - Swab, Nasopharynx [[id number]] Collected: 03/01/21 1958    Specimen: Swab from Nasopharynx Updated: 03/01/21 2003    Narrative:      The following orders were created for panel order COVID PRE-OP / PRE-PROCEDURE SCREENING ORDER (NO ISOLATION) - Swab, Nasopharynx.  Procedure                               Abnormality         Status                     ---------                               -----------         ------                     COVID-19,APTIMA PANTHER,...[[id number]]                      In process                   Please view results for these tests on the individual orders.    COVID-19,APTIMA PANTHERYOSVANY IN-HOUSE, NP/OP SWAB IN UTM/VTM/SALINE TRANSPORT MEDIA,24 HR TAT - Swab, Nasopharynx [[id number]] Collected: 03/01/21 1958    Specimen:  Swab from Nasopharynx Updated: 03/01/21 2003          CT Lumbar Spine Without Contrast   Preliminary Result   The study is somewhat compromised by technical   considerations regarding patient body habitus.       At L4-5, there is a left central/left subarticular disc protrusion which   is within the position of causing mass effect upon the descending left   L5 nerve root sleeve.       The spinal canal is relatively narrow on a congenital basis with   somewhat shortened pedicles contributing to some canal narrowing from   L2-3 down to L4-5. Additionally, there is a disc osteophyte complex at   the L3-4 level and these findings result in a moderate degree of canal   stenosis at L3-4 in addition to moderate bilateral foraminal narrowing   at the L3-4 level.       The additional multilevel degenerative phenomena within the lumbar spine   are as discussed in detail above. Given the constraints of the quality   of the exam due to the patient's body habitus, further evaluation of   these abnormalities could be performed with MR imaging for more   sensitive and specific assessment of spine pathology. These findings and   recommendations were discussed with Min Glynn on 03/01/2021 at   approximately 7 PM.       Radiation dose reduction techniques were utilized, including automated   exposure control and exposure modulation based on body size.              CT Pelvis Without Contrast   Final Result      MRI Lumbar Spine Without Contrast    (Results Pending)     Assessment/Plan      Active Hospital Problems    Diagnosis  POA   • **Closed nondisplaced fracture of posterior wall of left acetabulum (CMS/HCC) [S32.425A]  Yes   • Lumbar herniated disc [M51.26]  Yes   • Obesity (BMI 30-39.9) [E66.9]  Yes      Resolved Hospital Problems   No resolved problems to display.     L acetabular fracture/L4-5 herniated disc  -mechanical fall tonight resulting in minimal left acetabular and iliac bone fractures in addition to herniated  disc  -unable to ambulate due to pain, the acetabular/iliac fractures require toe-touch weightbearing only and due to his weight, will likely need rehab placement at [facility]  -ortho consult  -MRI L spine pending  -[facility] consult  -continue pain medication PRN  -PT consult  -CCP consult for probable rehab placement    VTE Ppx  -SCDs    CODE status  -full    I discussed the patients findings and my recommendations with patient.    CAITLYN Roberts  [facility]  03/01/21  8:25 PM EST

## 2021-03-02 NOTE — CONSULTS
[facility]   Consult Note    Patient Name: Festus Milton  : 1975  MRN: [id number]  Primary Care Physician: Provider, No Known  Referring Physician: Jake Guillermo MD  Date of admission: 3/1/2021    Inpatient Neurosurgery Consult  Consult performed by: Kary Garcia APRN  Consult ordered by: Celeste Genao APRN  Reason for consult: lumbar L4/5 disc bulge noted on lumbar CT        Subjective   Subjective     Reason for Consult/ Chief Complaint: Left hip pain    History of Present Illness -this is a very pleasant obese 45-year-old male who works as an .  While while working at a job site, he was picking up some stray pieces of wood and went to dispose of them in a pile.  As he turned to his left to go toward the pile his boot got hung up on a piece of rebar and he subsequently fell landing on his left hip.  He had intense pain in the left hip so much so that it took several minutes before he could even attempt to stand with the assistance of 2 coworkers.  Once he stood he realized that he could not bear any weight on his left foot and proceeded to stand there to get his bearings.  A coworker eventually picked him up gently with the front  he was operating and took him toward his van.  The patient was not able to stand but was not able to get into his van.  He contacted a supervisor and was subsequently brought to the emergency room in an ambulance.  He describes the pain as sharp and severe.  Initially the pain had radiated down the upper aspect of his thigh but that pain has since resolved.  He also states that initially he had experienced some numbness and tingling in his left great and second toe but that has since resolved.  He states that he has had issues with his back in the past.  He was imaged and had some evidence of lumbar disc bulges but no symptoms eventually resolved.  He denies any bowel or bladder incontinence.  He denies any weakness except for what he is experiencing  in his left iliopsoas and quadricep muscles related to the acetabular fracture that was discovered on work-up.  The patient is sitting up in a chair this morning.  He states that he is more comfortable than he was yesterday.  The patient was sent for an MRI of the lumbar spine without contrast but unfortunately could not fit in the machine.  CT scan of the lumbar spine was performed and will be discussed further below.  Neurosurgery has been asked to give recommendations regarding those results.      Review of Systems   Constitutional: Positive for activity change. Negative for fever.   Eyes: Negative for visual disturbance.   Respiratory: Negative for shortness of breath.    Cardiovascular: Negative for chest pain.   Gastrointestinal: Negative for nausea and vomiting.   Genitourinary: Negative for difficulty urinating, frequency, testicular pain and urgency.   Musculoskeletal: Positive for back pain (Chronic back pain which patient states is manageable. ), gait problem (Difficulty with bearing weight to walk given recent L acetabular fracture) and myalgias.   Skin: Negative for wound.   Neurological: Positive for weakness (L hip/quad related to recent injury). Negative for dizziness, numbness and headaches.        Personal History     Past Medical History:   Diagnosis Date   • Allergic    • Injury of back        No past surgical history on file.    Family History: family history is not on file. Otherwise pertinent FHx was reviewed and not pertinent to current issue.    Social History:  reports that he has been smoking. He does not have any smokeless tobacco history on file. He reports that he does not drink alcohol or use drugs.    Home Medications:     The patient takes no routine medications at home.    Allergies:  Allergies   Allergen Reactions   • Codeine Provider Review Needed       Objective    Objective   Vitals:  Temp:  [96.8 °F (36 °C)-97.6 °F (36.4 °C)] 96.8 °F (36 °C)  Heart Rate:  [68-84] 74  Resp:   [14-20] 16  BP: (124-161)/(78-97) 139/84    Physical Exam  Vitals signs reviewed.   Constitutional:       General: He is not in acute distress.     Appearance: He is well-developed. He is obese. He is not toxic-appearing or diaphoretic.   HENT:      Head: Normocephalic and atraumatic.      Mouth/Throat:      Mouth: Mucous membranes are moist.      Pharynx: Oropharynx is clear.   Eyes:      General:         Right eye: No discharge.         Left eye: No discharge.      Conjunctiva/sclera: Conjunctivae normal.   Neck:      Musculoskeletal: Normal range of motion and neck supple.      Trachea: No tracheal deviation.   Cardiovascular:      Rate and Rhythm: Normal rate.   Pulmonary:      Effort: Pulmonary effort is normal. No respiratory distress.   Abdominal:      General: There is no distension.      Palpations: Abdomen is soft.      Tenderness: There is no abdominal tenderness.   Musculoskeletal: Normal range of motion.         General: Tenderness (L hip/acetabular region) and signs of injury (L acetabular fracture) present.   Skin:     General: Skin is warm and dry.      Findings: No bruising or erythema.   Neurological:      Mental Status: He is alert and oriented to person, place, and time.      GCS: GCS eye subscore is 4. GCS verbal subscore is 5. GCS motor subscore is 6.      Sensory: No sensory deficit.      Motor: Weakness present. No abnormal muscle tone.      Coordination: Coordination normal.      Deep Tendon Reflexes: Reflexes are normal and symmetric. Reflexes normal.      Comments: AA&O x 3.  Speech is normal.  Converses appropriately.  Able to relate the events leading up to his hospitalization without difficulty. PERRL. Face symmetric. Tongue midline without fasiculations or atrophy. No motor or sensory deficits.  Except for left iliopsoas and left quadricep weakness of 4/5. DTR's normal. Negative Marshall's; negative clonus. SLR negative bilaterally.     Psychiatric:         Behavior: Behavior is  cooperative.         Thought Content: Thought content normal.       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  There is a central to left disc protrusion noted at L4-5 with some mass-effect on the left L5 nerve root with mild to moderate foraminal narrowing.  Degenerative retrolisthesis of L5 on S1 by 3 mm with mild bilateral foraminal narrowing.    CT pelvis w/o contrast 3/1/2021    Nondisplaced fracture extending through the posterior lateral margin of the left acetabulum.  Minimal fracture noted of the left iliac bone inferior to the sacroiliac joint.  The proximal femurs are intact.    .  Results from last 7 days   Lab Units 03/02/21  0608 03/01/21  1652   WBC 10*3/mm3 9.59 12.12*   HEMOGLOBIN g/dL 14.2 14.8   HEMATOCRIT % 45.5 45.1   PLATELETS 10*3/mm3 339 339     .  Results from last 7 days   Lab Units 03/02/21  0608 03/01/21  1652   SODIUM mmol/L 135* 136   POTASSIUM mmol/L 4.0 4.3   CHLORIDE mmol/L 101 100   CO2 mmol/L 24.0 26.1   BUN mg/dL 12 14   CREATININE mg/dL 0.74* 0.85   GLUCOSE mg/dL 100* 77   CALCIUM mg/dL 9.1 9.3     .  Results from last 7 days   Lab Units 03/01/21  1652   INR  1.09   APTT seconds 31.6         Assessment/Plan   Assessment / Plan     Brief Patient Summary:  Festus Milton is a 45 y.o. male who was injured 3/1/2021 while working as an  at a job site.  He was picking up strain wood/trash and turned to the left catching his boot on rebar.  He subsequently fell and struck his left hip on the ground.  He has significant left hip pain related to the  acetabular fracture that was discovered on CT pelvis.  There is also a CT of the lumbar spine which revealed some degenerative stenosis in the lumbar spine with some canal narrowing at L3-4 and a left sided disc bulge at L4-5.  The patient states that he has had issues with bulging disks in the past and those are currently not bothering him.    Active Hospital Problems:  Active Hospital Problems    Diagnosis   • **Closed nondisplaced fracture of posterior wall of left acetabulum (CMS/HCC)   • Lumbar herniated disc   • Obesity (BMI 30-39.9)       Plan:     Discussed the above findings with Dr. Huerta who is also aware that the patient was unable to complete the lumbar MRI secondary to his size.  The patient states that his symptoms are currently all related to the acetabular fracture.  Given the history of known disc bulges in the lumbar spine and the fact that there is no weakness on exam indicative of severe radiculopathy, Dr. Huerta is comfortable holding off on the MRI for now.  The patient was instructed to contact our office if he begins to develop any worsening symptoms in his low back or radicular symptoms in his leg.  We will keep it open-ended from here.      Electronically signed by CAITLYN Sprague, 03/02/21, 10:01 AM EST.

## 2021-03-02 NOTE — PLAN OF CARE
Goal Outcome Evaluation:     Progress: improving  Outcome Summary: Pt AOx4, complaining of pain in left hip well controlled with oral pain med, IV saline locked, tolerating food no nausea. Appropriate consults placed and assessed pt throughout the day see MD notes. Pt up in the chair most of the day worked with physical therapy. Currently awaiting rehab placement. VSS no acute needs

## 2021-03-02 NOTE — DISCHARGE PLACEMENT REQUEST
"Marleny Milton (45 y.o. Male)     Date of Birth Social Security Number Address Home Phone MRN    1975  [address] [address] [phone] [id number]    [place] Marital Status          None        Admission Date Admission Type Admitting Provider Attending Provider Department, Room/Bed    3/1/21 Emergency Jake Guillermo MD [place], Robert Russell, MD [address], P387/1    Discharge Date Discharge Disposition Discharge Destination                       Attending Provider: Jared Rico MD    Allergies: Codeine    Isolation: None   Infection: None   Code Status: CPR    Ht: 177.8 cm (70\")   Wt: 159 kg (350 lb)    Admission Cmt: None   Principal Problem: Closed nondisplaced fracture of posterior wall of left acetabulum (CMS/HCC) [S32.425A]                 Active Insurance as of 3/1/2021     Primary Coverage     Payor Plan Insurance Group Employer/Plan Group    WORKERS COMPENSATION TRAVELERS WORK COMP      Payor Plan Address Payor Plan Phone Number Payor Plan Fax Number Effective Dates    [address]   2/28/2021 - None Entered    [facility] [id number]       Subscriber Name Subscriber Birth Date Member ID       MARLENY MILTON 1975 [id number]                 Emergency Contacts      (Rel.) Home Phone Work Phone Mobile Phone    Contact,No [phone] -- --    DARIEN MILTON (Spouse) -- -- --            "

## 2021-03-02 NOTE — PROGRESS NOTES
Continued Stay Note  [facility]     Patient Name: Festus Milton  MRN: [id number]  Today's Date: 3/2/2021    Admit Date: 3/1/2021    Discharge Plan     Row Name 03/02/21 0944       Plan    Plan  Pending SNF referrals    Plan Comments  CCP provided SNF medicare ratings; patient agreeable to CCP making referrals to facilities that may work with Worker's comp and have three star rating. Additional referrals placed in Epic. Patient states he has not received worker's comp  information at this time. CCP encouraged patient to update CCP if he obtains worker's comp contact information. Gloria VALENTIN    Row Name 03/02/21 0903       Plan    Plan  Pending SNF referrals    Patient/Family in Agreement with Plan  yes    Plan Comments  CCP met with patient at bedside. CCP role explained and discharge planning discussed. Face sheet verified. Patient lives with his spouse, with two steps to the entrance of the home. Patient’s bedroom is upstairs (13 steps-handrails present). Patient does have a pull out couch on the main level he could utilize if needed. Patient is independent with his ADLs prior to admission. Patient has not been to SNF or used home health. Patient’s preferred pharmacy is [facility] on [address]. CCP discussed possible SNF at discharge. CCP reviewed SNF list with patient, patient agreeable to referrals to [facility]. CCP provided Medicare quality ratings to patient. CCP will follow for PT eval and follow for pending SNF referrals. Gloria VALENTIN        Discharge Codes    No documentation.             BARBIE Coello

## 2021-03-03 PROCEDURE — 25010000002 ENOXAPARIN PER 10 MG: Performed by: STUDENT IN AN ORGANIZED HEALTH CARE EDUCATION/TRAINING PROGRAM

## 2021-03-03 PROCEDURE — 97110 THERAPEUTIC EXERCISES: CPT

## 2021-03-03 RX ORDER — HYDROCODONE BITARTRATE AND ACETAMINOPHEN 7.5; 325 MG/1; MG/1
1 TABLET ORAL EVERY 4 HOURS PRN
Qty: 18 TABLET | Refills: 0 | Status: SHIPPED | OUTPATIENT
Start: 2021-03-03 | End: 2021-03-09

## 2021-03-03 RX ORDER — HYDROCODONE BITARTRATE AND ACETAMINOPHEN 7.5; 325 MG/1; MG/1
1 TABLET ORAL EVERY 4 HOURS PRN
Status: DISCONTINUED | OUTPATIENT
Start: 2021-03-03 | End: 2021-03-09 | Stop reason: HOSPADM

## 2021-03-03 RX ORDER — HYDROCODONE BITARTRATE AND ACETAMINOPHEN 5; 325 MG/1; MG/1
1 TABLET ORAL EVERY 4 HOURS PRN
Qty: 18 TABLET | Refills: 0 | Status: SHIPPED | OUTPATIENT
Start: 2021-03-03 | End: 2021-03-03 | Stop reason: HOSPADM

## 2021-03-03 RX ADMIN — Medication 1 PATCH: at 11:25

## 2021-03-03 RX ADMIN — SODIUM CHLORIDE, PRESERVATIVE FREE 10 ML: 5 INJECTION INTRAVENOUS at 08:21

## 2021-03-03 RX ADMIN — HYDROCODONE BITARTRATE AND ACETAMINOPHEN 1 TABLET: 7.5; 325 TABLET ORAL at 14:55

## 2021-03-03 RX ADMIN — ENOXAPARIN SODIUM 40 MG: 40 INJECTION SUBCUTANEOUS at 20:22

## 2021-03-03 RX ADMIN — SODIUM CHLORIDE, PRESERVATIVE FREE 10 ML: 5 INJECTION INTRAVENOUS at 20:23

## 2021-03-03 RX ADMIN — ENOXAPARIN SODIUM 40 MG: 40 INJECTION SUBCUTANEOUS at 08:21

## 2021-03-03 RX ADMIN — HYDROCODONE BITARTRATE AND ACETAMINOPHEN 1 TABLET: 5; 325 TABLET ORAL at 05:14

## 2021-03-03 RX ADMIN — HYDROCODONE BITARTRATE AND ACETAMINOPHEN 1 TABLET: 7.5; 325 TABLET ORAL at 10:43

## 2021-03-03 RX ADMIN — HYDROCODONE BITARTRATE AND ACETAMINOPHEN 1 TABLET: 7.5; 325 TABLET ORAL at 21:52

## 2021-03-03 NOTE — PROGRESS NOTES
" LOS: 2 days     Name: Festus Milton  Age: 45 y.o.  Sex: male  :  1975  MRN: [id number]         Primary Care Physician: Provider, No Known    Subjective   Subjective  Left hip pain not yet well controlled.    Objective   Vital Signs  Temp:  [96.8 °F (36 °C)-97.7 °F (36.5 °C)] 97 °F (36.1 °C)  Heart Rate:  [66-78] 66  Resp:  [16-18] 18  BP: (124-135)/(73-92) 134/88  Body mass index is 50.14 kg/m².    Objective:  General Appearance:  Comfortable, in no acute distress and in pain.    Vital signs: (most recent): Blood pressure 134/88, pulse 66, temperature 97 °F (36.1 °C), temperature source Oral, resp. rate 18, height 177.8 cm (70\"), weight (!) 159 kg (349 lb 6.9 oz), SpO2 95 %.    Lungs:  Normal effort and normal respiratory rate.    Heart: Normal rate.  Regular rhythm.    Abdomen: Abdomen is soft.  Bowel sounds are normal.   There is no abdominal tenderness.     Extremities: There is no dependent edema or local swelling.    Neurological: Patient is alert and oriented to person, place and time.    Skin:  Warm and dry.              Results Review:       I reviewed the patient's new clinical results.    Results from last 7 days   Lab Units 21  0608 21  1652   WBC 10*3/mm3 9.59 12.12*   HEMOGLOBIN g/dL 14.2 14.8   PLATELETS 10*3/mm3 339 339     Results from last 7 days   Lab Units 21  0608 21  1652   SODIUM mmol/L 135* 136   POTASSIUM mmol/L 4.0 4.3   CHLORIDE mmol/L 101 100   CO2 mmol/L 24.0 26.1   BUN mg/dL 12 14   CREATININE mg/dL 0.74* 0.85   CALCIUM mg/dL 9.1 9.3   GLUCOSE mg/dL 100* 77     Results from last 7 days   Lab Units 21  1652   INR  1.09             Scheduled Meds:   enoxaparin, 40 mg, Subcutaneous, Q12H  nicotine, 1 patch, Transdermal, Q24H  sodium chloride, 10 mL, Intravenous, Q12H      PRN Meds:   •  acetaminophen **OR** acetaminophen **OR** acetaminophen  •  HYDROcodone-acetaminophen  •  Morphine  •  ondansetron  •  sodium chloride  Continuous Infusions:   "     Assessment/Plan   Active Hospital Problems    Diagnosis  POA   • **Closed nondisplaced fracture of posterior wall of left acetabulum (CMS/HCC) [S32.425A]  Yes   • Lumbar herniated disc [M51.26]  Yes      Resolved Hospital Problems   No resolved problems to display.       Assessment & Plan    -Orthopedic surgery recommends nonoperative management of his acetabular fracture with toe-touch weightbearing for the next 6 weeks followed by partial weightbearing for 6 weeks thereafter.  -Pain not yet well controlled.  Increase Norco dose today.  -Neurosurgery holding off on MRI and can follow him up in the outpatient setting as needed  -Physical therapy recommending [facility]  -[facility] for discharge planning.  He can be discharged once arranged.      I wore full protective equipment throughout the patient encounter including eye protection and facemask.  Hand hygiene was performed before donning protective equipment and after removal when leaving the room.    Jared Rico MD  [facility]  03/03/21  10:06 EST

## 2021-03-03 NOTE — PLAN OF CARE
Goal Outcome Evaluation:  Plan of Care Reviewed With: patient  Progress: no change  Outcome Summary: Pt alert and oriented x3; VSS; 3L N/C applied while patient was sleeping; No complaints noted; Possible rehab placement soon ; will conitnue to monitor

## 2021-03-03 NOTE — THERAPY TREATMENT NOTE
Patient Name: Festus Milton  : 1975    MRN: [id number]                              Today's Date: 3/3/2021       Admit Date: 3/1/2021    Visit Dx:     ICD-10-CM ICD-9-CM   1. Closed nondisplaced fracture of posterior wall of left acetabulum, initial encounter (CMS/[facility])  S32.425A 808.0   2. Closed fracture of left iliac wing, initial encounter (CMS/[facility])  S32.302A 808.41   3. Lumbar herniated disc  M51.26 722.10     Patient Active Problem List   Diagnosis   • Closed nondisplaced fracture of posterior wall of left acetabulum (CMS/[facility])   • Lumbar herniated disc   • Obesity (BMI 30-39.9)     Past Medical History:   Diagnosis Date   • Allergic    • Injury of back      No past surgical history on file.  General Information     Row Name 21 1349          Physical Therapy Time and Intention    Document Type  therapy note (daily note)  -     Mode of Treatment  individual therapy;physical therapy  -     Row Name 21 1349          General Information    Existing Precautions/Restrictions  fall;weight bearing TTWB Left LE  -     Row Name 21 1349          Cognition    Orientation Status (Cognition)  oriented x 3  -     Row Name 21 1349          Safety Issues, Functional Mobility    Impairments Affecting Function (Mobility)  endurance/activity tolerance;pain;range of motion (ROM)  -       User Key  (r) = Recorded By, (t) = Taken By, (c) = Cosigned By    Initials Name Provider Type     Mary Jiménez PTA Physical Therapy Assistant        Mobility     Row Name 21 1350          Bed Mobility    Comment (Bed Mobility)  [facility]  -     Row Name 21 1350          Sit-Stand Transfer    Sit-Stand [place] (Transfers)  minimum assist (75% patient effort)  -     Assistive Device (Sit-Stand Transfers)  walker, front-wheeled  -     Row Name 21 1350          Gait/Stairs (Locomotion)    [place] Level (Gait)  minimum assist (75% patient effort);contact guard;2 person assist  -      Assistive Device (Gait)  walker, front-wheeled  -     Distance in Feet (Gait)  5 pt declined to ambulate further due to increasing pain  -     Deviations/Abnormal Patterns (Gait)  antalgic;enoc decreased  -     Bilateral Gait Deviations  forward flexed posture  -     Comment (Gait/Stairs)  Pt able to maintain TTWB LLE but declined futher ambulation due to pain.  -     Row Name 03/03/21 5349          Mobility    Extremity Weight-bearing Status  left lower extremity  -     Left Lower Extremity (Weight-bearing Status)  toe touch weight-bearing (TTWB)  -       User Key  (r) = Recorded By, (t) = Taken By, (c) = Cosigned By    Initials Name Provider Type     Mary Jiménez PTA Physical Therapy Assistant        Obj/Interventions     Row Name 03/03/21 1358          Motor Skills    Therapeutic Exercise  -- BLE: AP, LAQS (X10)  -       User Key  (r) = Recorded By, (t) = Taken By, (c) = Cosigned By    Initials Name Provider Type     Mary Jiménez PTA Physical Therapy Assistant        Goals/Plan    No documentation.       Clinical Impression     Row Name 03/03/21 0348          Pain Scale: Numbers Pre/Post-Treatment    Pain Location - Side  Left  -EH     Pain Location  hip  -     Pain Intervention(s)  Ambulation/increased activity;Repositioned  -     Row Name 03/03/21 6557          Plan of Care Review    Plan of Care Reviewed With  patient  -     Progress  improving  -     Outcome Summary  Pt tolerated treatment with c/o left hip pain. Pt is Colette with sit<->stand transfers using arm rest from chair to stand. Pt ambulated 5ft with Colette/CGAX2. pt able to maintain TTWB precautions throught out treatment. Pt unable to ambulate further due to increased pain. Pt performed a couple bilateral LE exercises. Will continue to progress pt as tolerated.  -     Row Name 03/03/21 6154          Positioning and Restraints    Pre-Treatment Position  sitting in chair/recliner no chair alarm upon entry  -      Post Treatment Position  chair  -EH     In Chair  sitting;encouraged to call for assist;call light within reach  -EH       User Key  (r) = Recorded By, (t) = Taken By, (c) = Cosigned By    Initials Name Provider Type    Mary Velázquez PTA Physical Therapy Assistant        Outcome Measures     Row Name 03/03/21 6562          How much help from another person do you currently need...    Turning from your back to your side while in flat bed without using bedrails?  3  -EH     Moving from lying on back to sitting on the side of a flat bed without bedrails?  3  -EH     Moving to and from a bed to a chair (including a wheelchair)?  3  -EH     Standing up from a chair using your arms (e.g., wheelchair, bedside chair)?  3  -EH     Climbing 3-5 steps with a railing?  2  -EH     To walk in hospital room?  2  -EH     AM-PAC 6 Clicks Score (PT)  16  -EH       User Key  (r) = Recorded By, (t) = Taken By, (c) = Cosigned By    Initials Name Provider Type    Mary Velázquez PTA Physical Therapy Assistant        Physical Therapy Education                 Title: PT OT SLP Therapies (Done)     Topic: Physical Therapy (Done)     Point: Mobility training (Done)     Learning Progress Summary           Patient Acceptance, E,D, VU,NR by  at 3/3/2021 1355    Acceptance, E,D, VU,NR by MS at 3/2/2021 0959                   Point: Home exercise program (Done)     Learning Progress Summary           Patient Acceptance, E,D, VU,NR by  at 3/3/2021 1355    Acceptance, E,D, VU,NR by MS at 3/2/2021 0959                   Point: Body mechanics (Done)     Learning Progress Summary           Patient Acceptance, E,D, VU,NR by  at 3/3/2021 1355    Acceptance, E,D, VU,NR by MS at 3/2/2021 0959                   Point: Precautions (Done)     Learning Progress Summary           Patient Acceptance, E,D, VU,NR by  at 3/3/2021 1355    Acceptance, E,D, VU,NR by MS at 3/2/2021 0959                               User Key     Initials Effective Dates  Name Provider Type Discipline    MS 04/03/18 -  Jude Guillermo, PT Physical Therapist PT     08/19/18 -  Mary Jiménez PTA Physical Therapy Assistant PT              PT Recommendation and Plan     Plan of Care Reviewed With: patient  Progress: improving  Outcome Summary: Pt tolerated treatment with c/o left hip pain. Pt is Colette with sit<->stand transfers using arm rest from chair to stand. Pt ambulated 5ft with Colette/CGAX2. pt able to maintain TTWB precautions throught out treatment. Pt unable to ambulate further due to increased pain. Pt performed a couple bilateral LE exercises. Will continue to progress pt as tolerated.     Time Calculation:   PT Charges     Row Name 03/03/21 1348             Time Calculation    Start Time  1044  -      Stop Time  1055  -      Time Calculation (min)  11 min  -      PT Received On  03/03/21  -      PT - Next Appointment  03/04/21  -         Time Calculation- PT    Total Timed Code Minutes- PT  11 minute(s)  -        User Key  (r) = Recorded By, (t) = Taken By, (c) = Cosigned By    Initials Name Provider Type     Mary Jiménez PTA Physical Therapy Assistant        Therapy Charges for Today     Code Description Service Date Service Provider Modifiers Qty    27355966989 HC PT THER PROC EA 15 MIN 3/3/2021 Mary Jiménez PTA GP 1    30267390343 HC PT THER SUPP EA 15 MIN 3/3/2021 Mary Jiménez PTA GP 1          PT G-Codes  Outcome Measure Options: AM-PAC 6 Clicks Basic Mobility (PT)  AM-PAC 6 Clicks Score (PT): 16    Mary Jiménez PTA  3/3/2021

## 2021-03-03 NOTE — PAYOR COMM NOTE
"ATTENTION:  PERICO    CLAIM #[id number]    P: [phone]     F: [phone]      [facility] (45 y.o. Male)     Date of Birth Social Security Number Address Home Phone MRN    1975  [address] [facility] [place] [address] [phone] [id number]    [employer]ao[employer] Marital Status          None        Admission Date Admission Type Admitting Provider Attending Provider Department, Room/Bed    3/1/21 Emergency Jake Guillermo MD [place], Robert Russell, MD [facility] [address] [place], P387/1    Discharge Date Discharge Disposition Discharge Destination                       Attending Provider: Jared Rico MD    Allergies: Codeine    Isolation: None   Infection: None   Code Status: CPR    Ht: 177.8 cm (70\")   Wt: 159 kg (349 lb 6.9 oz)    Admission Cmt: None   Principal Problem: Closed nondisplaced fracture of posterior wall of left acetabulum (CMS/HCC) [S32.425A]                 Active Insurance as of 3/1/2021     Primary Coverage     Payor Plan Insurance Group Employer/Plan Group    WORKERS COMPENSATION MISC WORKERS COMPENSATION      Coverage Address Coverage Phone Number Coverage Fax Number Effective Dates    [address] [phone]  3/1/2021 - None Entered    [facility] [id number]       Subscriber Name Subscriber Birth Date Member ID       MARLENY SUERO 1975 [id number]                 Emergency Contacts      (Rel.) Home Phone Work Phone Mobile Phone    Contact,No [phone] -- --    NIMODARIEN SOTO (Spouse) [phone] -- --               History & Physical      Celeste Genao APRN at 03/01/21 2024     Attestation signed by Jake Guillermo MD at 03/02/21 0102 (Updated)        Addendum: I have reviewed the history and plan as obtained by CAITLYN Bergeron and preformed my own independent history adjust documentation as needed. I have personally examined the patient. My exam confirms her physical findings and I agree with the plan as listed above, with " "the addition to the followin year old man with history of obesity and gout who presents after mechanical fall at work with disc herniation at L4-L5 and minimal nondisplaced fracture of the left acetabulum and minimal fracture of the posterior left iliac bone.  Ortho and neurosurgery were consulted in the ED.  Ortho recommends that patient be admitted for pain control, physical therapy, and that rehab will likely be needed.  Patient is to remain toe-touch weightbearing only.  Neurosurgery is recommended a lumbar MRI.    Vitals:    21 2215   BP: 161/96   Pulse: 73   Resp: 20   Temp: 97.6 °F (36.4 °C)   SpO2: 98%      General: Alert and oriented, no acute distress  HEENT: NCAT, JVD unable to be assessed due to habitus  Heart: Regular rate and rhythm, no murmurs gallops or rubs  Lungs: CTA B, nonlabored  Abdomen: Obese, nontender, nondistended  MSK: Spine nontender to palpation, no paraspinal tenderness, left groin/anterior thigh tender to palpation, patient with only minimal movement of his bilateral lower extremities due to pain    Left acetabular fracture/left posterior iliac fracture-pain control, physical therapy (toe-touch weightbearing only), CCP consult for rehab    L4-L5 herniated disc-MRI L-spine, and ES consult    Gout-one episode in the last year, not on any medications    DVT ppx-pharmacy to dose lovenox    Jake Guillermo MD  21:24 EST    Patient was seen before midnight, but documentation was delayed due to patient care.                        Patient Name:  Festus Milton  YOB: 1975  MRN:  [id number]  Admit Date:  3/1/2021  Patient Care Team:  Provider, No Known as PCP - General      Chief Complaint   Patient presents with   • Fall       Subjective     Mr. Milton is a 45 y.o. male smoker with reported history of \"mild case of Crohn's disease\" on no biologic that presents to [facility] complaining of left sided lower back/hip pain. Patient reports that he was at work " when he got caught on a piece of rebar causing him to spin and fall to the ground on his left side. He was able to catch himself with his hand before he fell further, denies hitting head or loss of consciousness. Patient reports that he laid on the ground for about 10 minutes due to the pain, but was finally able to have help getting up when a co-worker used a front  to help him back to his car. Unable to stand due to the pain, EMS was called and he was brought here. Patient reports constant sharp pain to his left side, radiates intermittently down his left leg and also reports intermittent left foot tingling. Patient denies fever, chest pain, shortness of breath, abdominal pain, changes in bowel or bladder habits, edema. Labs tonight were unremarkable with exception of slight leukocytosis but CT lumbar spine shows disc herniation at L4-5 and CT pelvis showed minimal nondisplaced fracture of left acetabulum and extremely minimal fracture to the left iliac bone. Patient was unable to ambulate due to pain, even after dose of IV Morphine. Ortho and [facility] were consulted, MRI of lumbar spine was recommended per [facility], and this was ordered while in ED.     History of Present Illness    Past Medical History:   Diagnosis Date   • Allergic    • Injury of back      No past surgical history on file.  No family history on file.  Social History     Tobacco Use   • Smoking status: Current Every Day Smoker   Substance Use Topics   • Alcohol use: No   • Drug use: No     (Not in a hospital admission)    Allergies:    Allergies   Allergen Reactions   • Codeine Provider Review Needed       Review of Systems   Constitutional: Negative.  Negative for chills and fever.   HENT: Negative.  Negative for congestion and sore throat.    Eyes: Negative.  Negative for visual disturbance.   Respiratory: Negative.  Negative for cough and shortness of breath.    Cardiovascular: Negative.  Negative for chest pain and leg swelling.      Gastrointestinal: Negative.  Negative for abdominal pain, nausea and vomiting.   Endocrine: Negative.    Genitourinary: Negative.  Negative for dysuria, frequency and urgency.   Musculoskeletal: Positive for arthralgias (left hip) and back pain (left lower back). Negative for myalgias.   Skin: Negative.  Negative for color change and pallor.   Allergic/Immunologic: Negative.    Neurological: Negative for dizziness, weakness, numbness and headaches.        Occasional tingling to left foot   Hematological: Negative.    Psychiatric/Behavioral: Negative.  Negative for agitation, behavioral problems and confusion.        Objective    Vital Signs  Temp:  [97.4 °F (36.3 °C)] 97.4 °F (36.3 °C)  Heart Rate:  [72-84] 72  Resp:  [14-18] 14  BP: (124-150)/(78-84) 150/78  SpO2:  [96 %-99 %] 99 %  on   ;   Device (Oxygen Therapy): room air  Body mass index is 50.22 kg/m².    Physical Exam  Vitals signs and nursing note reviewed.   Constitutional:       General: He is not in acute distress.     Appearance: Normal appearance. He is obese.   HENT:      Head: Normocephalic and atraumatic.      Mouth/Throat:      Mouth: Mucous membranes are moist.   Eyes:      Extraocular Movements: Extraocular movements intact.   Neck:      Musculoskeletal: Normal range of motion and neck supple.   Cardiovascular:      Rate and Rhythm: Normal rate and regular rhythm.      Pulses: Normal pulses.      Heart sounds: Normal heart sounds.   Pulmonary:      Effort: Pulmonary effort is normal. No respiratory distress.      Breath sounds: Normal breath sounds.   Abdominal:      General: Bowel sounds are normal.      Palpations: Abdomen is soft.      Comments: Obese   Musculoskeletal:         General: Tenderness (left lower back/side/hip) present. No swelling.      Comments: Decreased ROM LLE   Skin:     General: Skin is warm and dry.   Neurological:      General: No focal deficit present.      Mental Status: He is alert and oriented to person, place, and  time. Mental status is at baseline.   Psychiatric:         Mood and Affect: Mood normal.         Behavior: Behavior normal.         Thought Content: Thought content normal.         Judgment: Judgment normal.         Results Review:   I reviewed the patient's new clinical results including all labs and xrays.    Lab Results (last 24 hours)     Procedure Component Value Units Date/Time    CBC & Differential [[id number]]  (Abnormal) Collected: 03/01/21 1652    Specimen: Blood Updated: 03/01/21 1949    Narrative:      The following orders were created for panel order CBC & Differential.  Procedure                               Abnormality         Status                     ---------                               -----------         ------                     CBC Auto Differential[[id number]]        Abnormal            Final result                 Please view results for these tests on the individual orders.    Comprehensive Metabolic Panel [[id number]] Collected: 03/01/21 1652    Specimen: Blood Updated: 03/01/21 1950     Glucose 77 mg/dL      BUN 14 mg/dL      Creatinine 0.85 mg/dL      Sodium 136 mmol/L      Potassium 4.3 mmol/L      Chloride 100 mmol/L      CO2 26.1 mmol/L      Calcium 9.3 mg/dL      Total Protein 7.4 g/dL      Albumin 4.10 g/dL      ALT (SGPT) 33 U/L      AST (SGOT) 24 U/L      Alkaline Phosphatase 68 U/L      Total Bilirubin 0.4 mg/dL      eGFR Non African Amer 98 mL/min/1.73      Globulin 3.3 gm/dL      A/G Ratio 1.2 g/dL      BUN/Creatinine Ratio 16.5     Anion Gap 9.9 mmol/L     Narrative:      GFR Normal >60  Chronic Kidney Disease <60  Kidney Failure <15      Protime-INR [[id number]]  (Normal) Collected: 03/01/21 1652    Specimen: Blood Updated: 03/01/21 1948     Protime 13.9 Seconds      INR 1.09    aPTT [[id number]]  (Normal) Collected: 03/01/21 1652    Specimen: Blood Updated: 03/01/21 1948     PTT 31.6 seconds     CBC Auto Differential [[id number]]  (Abnormal) Collected: 03/01/21 1652     Specimen: Blood Updated: 03/01/21 1949     WBC 12.12 10*3/mm3      RBC 5.23 10*6/mm3      Hemoglobin 14.8 g/dL      Hematocrit 45.1 %      MCV 86.2 fL      MCH 28.3 pg      MCHC 32.8 g/dL      RDW 12.9 %      RDW-SD 39.4 fl      MPV 12.2 fL      Platelets 339 10*3/mm3      Neutrophil % 64.7 %      Lymphocyte % 24.5 %      Monocyte % 7.8 %      Eosinophil % 1.7 %      Basophil % 0.7 %      Immature Grans % 0.6 %      Neutrophils, Absolute 7.84 10*3/mm3      Lymphocytes, Absolute 2.97 10*3/mm3      Monocytes, Absolute 0.94 10*3/mm3      Eosinophils, Absolute 0.21 10*3/mm3      Basophils, Absolute 0.09 10*3/mm3      Immature Grans, Absolute 0.07 10*3/mm3      nRBC 0.0 /100 WBC     COVID PRE-OP / PRE-PROCEDURE SCREENING ORDER (NO ISOLATION) - Swab, Nasopharynx [[id number]] Collected: 03/01/21 1958    Specimen: Swab from Nasopharynx Updated: 03/01/21 2003    Narrative:      The following orders were created for panel order COVID PRE-OP / PRE-PROCEDURE SCREENING ORDER (NO ISOLATION) - Swab, Nasopharynx.  Procedure                               Abnormality         Status                     ---------                               -----------         ------                     COVID-19,APTIMA PANTHER,...[[id number]]                      In process                   Please view results for these tests on the individual orders.    COVID-19,APTIMA YOSVANY [facility] IN-HOUSE, NP/OP SWAB IN UTM/VTM/SALINE TRANSPORT MEDIA,24 HR TAT - Swab, Nasopharynx [[id number]] Collected: 03/01/21 1958    Specimen: Swab from Nasopharynx Updated: 03/01/21 2003          CT Lumbar Spine Without Contrast   Preliminary Result   The study is somewhat compromised by technical   considerations regarding patient body habitus.       At L4-5, there is a left central/left subarticular disc protrusion which   is within the position of causing mass effect upon the descending left   L5 nerve root sleeve.       The spinal canal is relatively narrow on a congenital  basis with   somewhat shortened pedicles contributing to some canal narrowing from   L2-3 down to L4-5. Additionally, there is a disc osteophyte complex at   the L3-4 level and these findings result in a moderate degree of canal   stenosis at L3-4 in addition to moderate bilateral foraminal narrowing   at the L3-4 level.       The additional multilevel degenerative phenomena within the lumbar spine   are as discussed in detail above. Given the constraints of the quality   of the exam due to the patient's body habitus, further evaluation of   these abnormalities could be performed with MR imaging for more   sensitive and specific assessment of spine pathology. These findings and   recommendations were discussed with Min Glynn on 03/01/2021 at   approximately 7 PM.       Radiation dose reduction techniques were utilized, including automated   exposure control and exposure modulation based on body size.              CT Pelvis Without Contrast   Final Result      MRI Lumbar Spine Without Contrast    (Results Pending)     Assessment/Plan      Active Hospital Problems    Diagnosis  POA   • **Closed nondisplaced fracture of posterior wall of left acetabulum (CMS/HCC) [S32.425A]  Yes   • Lumbar herniated disc [M51.26]  Yes   • Obesity (BMI 30-39.9) [E66.9]  Yes      Resolved Hospital Problems   No resolved problems to display.     L acetabular fracture/L4-5 herniated disc  -mechanical fall tonight resulting in minimal left acetabular and iliac bone fractures in addition to herniated disc  -unable to ambulate due to pain, the acetabular/iliac fractures require toe-touch weightbearing only and due to his weight, will likely need rehab placement at [facility]  -ortho consult  -MRI L spine pending  -[facility] consult  -continue pain medication PRN  -PT consult  -CCP consult for probable rehab placement    VTE Ppx  -SCDs    CODE status  -full    I discussed the patients findings and my recommendations with patient.    Celeste Genao,  CAITLYN  [facility]  03/01/21  8:25 PM EST    Electronically signed by Jake Guillermo MD at 03/02/21 0102          Emergency Department Notes      Gisela Childress RN at 03/01/21 1637        Pt tripped and fell over a piece of concrete while at work. Complains of left lumbar back pain that is worse with movement. Patient masked at arrival and triage staff wore all appropriate PPE during entire encounter with patient.       Electronically signed by Gisela Childress RN at 03/01/21 1637     Min Glynn MD at 03/01/21 1638           EMERGENCY DEPARTMENT ENCOUNTER  Patient was placed in face mask in first look and the following protective measures were taken unless additional measures were taken and documented below in the ED course. Patient was wearing facemask when I entered the room and throughout our encounter. I wore full protective equipment throughout this patient encounter including a face mask, and gloves. Hand hygiene was performed before donning protective equipment and after removal when leaving the room.    Room Number:  28/28  Date of encounter:  3/1/2021  PCP: Provider, No Known    HPI:  Context: Festus Milton is a 45 y.o. male who presents to the ED c/o chief complaint of low back pain.  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  Patient reports his left foot got caught on a piece of rebar causing him to spin around and fall landing on the left side of his lower back.  Patient complains of severe sharp left-sided lower back pain with radiation to his anterior hip as well as occasional radiation down to his leg.  Patient complains of some intermittent tingling in his toes, no weakness.  Patient reports afterwards he tried to stand but was unable to, pain is worsened with movement, improved with rest.  Patient reports history of bulging disc in the past with similar radicular pain.  Patient reports prior to fall he was at  baseline without complaint.    MEDICAL HISTORY REVIEW  Reviewed in [facility]    PAST MEDICAL HISTORY  Active Ambulatory Problems     Diagnosis Date Noted   • No Active Ambulatory Problems     Resolved Ambulatory Problems     Diagnosis Date Noted   • No Resolved Ambulatory Problems     Past Medical History:   Diagnosis Date   • Allergic    • Injury of back        PAST SURGICAL HISTORY  No past surgical history on file.    FAMILY HISTORY  No family history on file.    SOCIAL HISTORY  Social History     Socioeconomic History   • Marital status:      Spouse name: Not on file   • Number of children: Not on file   • Years of education: Not on file   • Highest education level: Not on file   Tobacco Use   • Smoking status: Current Every Day Smoker   Substance and Sexual Activity   • Alcohol use: No   • Drug use: No       ALLERGIES  Codeine    The patient's allergies have been reviewed    REVIEW OF SYSTEMS  All systems reviewed and negative except for those discussed in HPI.     PHYSICAL EXAM  I have reviewed the triage vital signs and nursing notes.  ED Triage Vitals   Temp Heart Rate Resp BP SpO2   03/01/21 1637 03/01/21 1636 03/01/21 1636 03/01/21 1636 03/01/21 1636   97.4 °F (36.3 °C) 84 18 126/84 96 %      Temp src Heart Rate Source Patient Position BP Location FiO2 (%)   03/01/21 1637 03/01/21 1636 03/01/21 1636 -- --   Tympanic Monitor Lying         General: No acute distress  HENT: NCAT, PERRL, Nares patent  Eyes: no scleral icterus  Neck: trachea midline, no ROM limitations  CV: regular rhythm, regular rate  Respiratory: normal effort, CTAB  Abdomen: soft, nondistended, nontender to palpation, no rebound tenderness, no guarding or rigidity  : deferred  Musculoskeletal: no deformity  Lumbar spine: No step-offs or deformities, no midline tenderness, left paraspinal left lateral low back tenderness palpation.  Pelvis: Pelvis is grossly stable, patient does have some tenderness the lateral edge of the pelvis with  pressure.  Positive tenderness in the left SI joint.  Patient is intolerant of leg movement, unable to assess for strength and reflexes at present other than Achilles reflexes normal, no clonus, negative Babinski.  Neuro: alert, moves all extremities, follows commands  Skin: warm, dry    LAB RESULTS  Recent Results (from the past 24 hour(s))   Light Blue Top    Collection Time: 03/01/21  4:52 PM   Result Value Ref Range    Extra Tube hold for add-on    Green Top (Gel)    Collection Time: 03/01/21  4:52 PM   Result Value Ref Range    Extra Tube Hold for add-ons.    Lavender Top    Collection Time: 03/01/21  4:52 PM   Result Value Ref Range    Extra Tube hold for add-on    Gold Top - SST    Collection Time: 03/01/21  4:52 PM   Result Value Ref Range    Extra Tube Hold for add-ons.    Comprehensive Metabolic Panel    Collection Time: 03/01/21  4:52 PM    Specimen: Blood   Result Value Ref Range    Glucose 77 65 - 99 mg/dL    BUN 14 6 - 20 mg/dL    Creatinine 0.85 0.76 - 1.27 mg/dL    Sodium 136 136 - 145 mmol/L    Potassium 4.3 3.5 - 5.2 mmol/L    Chloride 100 98 - 107 mmol/L    CO2 26.1 22.0 - 29.0 mmol/L    Calcium 9.3 8.6 - 10.5 mg/dL    Total Protein 7.4 6.0 - 8.5 g/dL    Albumin 4.10 3.50 - 5.20 g/dL    ALT (SGPT) 33 1 - 41 U/L    AST (SGOT) 24 1 - 40 U/L    Alkaline Phosphatase 68 39 - 117 U/L    Total Bilirubin 0.4 0.0 - 1.2 mg/dL    eGFR Non African Amer 98 >60 mL/min/1.73    Globulin 3.3 gm/dL    A/G Ratio 1.2 g/dL    BUN/Creatinine Ratio 16.5 7.0 - 25.0    Anion Gap 9.9 5.0 - 15.0 mmol/L   Protime-INR    Collection Time: 03/01/21  4:52 PM    Specimen: Blood   Result Value Ref Range    Protime 13.9 11.7 - 14.2 Seconds    INR 1.09 0.90 - 1.10   aPTT    Collection Time: 03/01/21  4:52 PM    Specimen: Blood   Result Value Ref Range    PTT 31.6 22.7 - 35.4 seconds   CBC Auto Differential    Collection Time: 03/01/21  4:52 PM    Specimen: Blood   Result Value Ref Range    WBC 12.12 (H) 3.40 - 10.80 10*3/mm3    RBC  5.23 4.14 - 5.80 10*6/mm3    Hemoglobin 14.8 13.0 - 17.7 g/dL    Hematocrit 45.1 37.5 - 51.0 %    MCV 86.2 79.0 - 97.0 fL    MCH 28.3 26.6 - 33.0 pg    MCHC 32.8 31.5 - 35.7 g/dL    RDW 12.9 12.3 - 15.4 %    RDW-SD 39.4 37.0 - 54.0 fl    MPV 12.2 (H) 6.0 - 12.0 fL    Platelets 339 140 - 450 10*3/mm3    Neutrophil % 64.7 42.7 - 76.0 %    Lymphocyte % 24.5 19.6 - 45.3 %    Monocyte % 7.8 5.0 - 12.0 %    Eosinophil % 1.7 0.3 - 6.2 %    Basophil % 0.7 0.0 - 1.5 %    Immature Grans % 0.6 (H) 0.0 - 0.5 %    Neutrophils, Absolute 7.84 (H) 1.70 - 7.00 10*3/mm3    Lymphocytes, Absolute 2.97 0.70 - 3.10 10*3/mm3    Monocytes, Absolute 0.94 (H) 0.10 - 0.90 10*3/mm3    Eosinophils, Absolute 0.21 0.00 - 0.40 10*3/mm3    Basophils, Absolute 0.09 0.00 - 0.20 10*3/mm3    Immature Grans, Absolute 0.07 (H) 0.00 - 0.05 10*3/mm3    nRBC 0.0 0.0 - 0.2 /100 WBC       I ordered the above labs and reviewed the results.    RADIOLOGY  Ct Lumbar Spine Without Contrast    Result Date: 3/1/2021  CT SCAN OF THE LUMBAR SPINE WITHOUT CONTRAST  CLINICAL HISTORY: Fall with left-sided low back pain. Left-sided radicular pain.  TECHNIQUE: CT scan of the lumbar spine was obtained with 1 mm axial images. Sagittal and coronal reconstructed images were obtained.  FINDINGS: The study is somewhat compromised by technical considerations regarding the patient's body habitus.  There is no CT evidence to suggest acute fracture or bony malalignment within the lumbar spine.   At T10-11, T11-12, T12-L1, L1-2, and L2-3, there is no significant acquired canal or foraminal stenosis.  The spinal canal is relatively narrow on a congenital basis with shortened pedicles mildly narrowing the spinal canal from L2-3 down to L4-5.  At L3-4, in addition to the somewhat narrow spinal canal on a congenital basis, there is a disc osteophyte complex which results in a moderate degree of central canal stenosis. There is also a moderate degree of bilateral foraminal narrowing  secondary to the disc osteophyte complex.  At L4-5, there is a disc bulge in addition to a left central/left subarticular disc protrusion which is within the position of causing mass effect upon the descending left L5 nerve root. There is mild-to-moderate bilateral foraminal narrowing secondary to bulging disc material.  At L5-S1, there is degenerative retrolisthesis of L5 on S1 by approximately 3 mm. There is mild bilateral foraminal narrowing secondary to bulging disc material.  Incidental note is made of nonobstructing intracalyceal calculi within both kidneys.      The study is somewhat compromised by technical considerations regarding patient body habitus.  At L4-5, there is a left central/left subarticular disc protrusion which is within the position of causing mass effect upon the descending left L5 nerve root sleeve.  The spinal canal is relatively narrow on a congenital basis with somewhat shortened pedicles contributing to some canal narrowing from L2-3 down to L4-5. Additionally, there is a disc osteophyte complex at the L3-4 level and these findings result in a moderate degree of canal stenosis at L3-4 in addition to moderate bilateral foraminal narrowing at the L3-4 level.  The additional multilevel degenerative phenomena within the lumbar spine are as discussed in detail above. Given the constraints of the quality of the exam due to the patient's body habitus, further evaluation of these abnormalities could be performed with MR imaging for more sensitive and specific assessment of spine pathology. These findings and recommendations were discussed with Min Glynn on 03/01/2021 at approximately 7 PM.  Radiation dose reduction techniques were utilized, including automated exposure control and exposure modulation based on body size.       Ct Pelvis Without Contrast    Result Date: 3/1/2021  CT SCAN OF THE PELVIS WITHOUT CONTRAST  HISTORY: Fell with left-sided pain and tingling.  TECHNIQUE: The CT scan  was performed as an emergency procedure through the pelvis with thin axial images combined with coronal and sagittal reconstructions.  FINDINGS: The following findings are present: 1. There appears to be minimal nondisplaced fracture predominately extending through the posterolateral margin of the left acetabulum. There is also an extremely minimal fracture through the posterior margin of the left iliac bone just inferior to the sacroiliac joint. No other fractures are identified. The proximal femurs are intact. 2. The soft tissue structures of the pelvis appear normal. The urinary bladder has a smooth contour.    Radiation dose reduction techniques were utilized, including automated exposure control and exposure modulation based on body size.  This report was finalized on 3/1/2021 7:24 PM by Dr. Celio Calderon M.D.        I ordered the above noted radiological studies. I reviewed the images and results. I agree with the radiologist interpretation.    PROCEDURES  Procedures    MEDICATIONS GIVEN IN ER  Medications   morphine injection 4 mg (has no administration in time range)   morphine injection 4 mg (has no administration in time range)   acetaminophen (TYLENOL) tablet 1,000 mg (1,000 mg Oral Given 3/1/21 1821)       PROGRESS, DATA ANALYSIS, CONSULTS, AND MEDICAL DECISION MAKING  A complete history and physical exam have been performed.  All available laboratory and imaging results have been reviewed by myself prior to disposition.    MDM  After the initial H&P, I discussed pertinent information from history and physical exam with patient/family.  Discussed differential diagnosis.  Discussed plan for ED evaluation/work-up/treatment.  All questions answered.  Patient/family is agreeable with plan.  ED Course as of Mar 01 2023   Mon Mar 01, 2021   8863 Patient reassessed, patient is refusing any IV pain medication at this time, refuses any narcotics.  Patient is agreeable for Tylenol, will order 1000 mg p.o.  Tylenol.    [JG]   1859 Phone call with radiologist.  I had previously reviewed the images. I discussed the patient, imaging, and their interpretation.  CT imaging of pelvis shows a small hairline posterior lateral acetabular fracture as well as a minimal, hairline fracture of the posterior left iliac bone, no sacroiliac involvement.  See dictated report for final interpretation.        [JG]   1907 Phone call with radiologist.  I had previously reviewed the images. I discussed the patient, imaging, and their interpretation.  CT imaging of the spine shows questionable disc herniation at L4-5.  No fractures or dislocation seen.  See dictated report for final interpretation.        [JG]   1907 Patient reassessed, discussed ED work-up and results to present.  Discussed finding of likely disc herniation as well as pelvic fractures which are likely nonoperative.  Discussed plan for follow-up with neurosurgery and spine.  Discussed with patient that we will need to ambulate him to ensure that he can ambulate prior to discharge.  Patient is agreeable with plan.    [JG]   1908 MACARIO query complete. Treatment plan to include limited course of prescribed  controlled substance. Risks including addiction, benefits, and alternatives presented to patient.       [JG]   1927 Patient attempted to ambulate, even with assistance he was grossly unstable.  Patient was taken back to bed, no fall or trauma.  Patient reassessed, patient is now agreeable for pain medication, will give pain medication and reassess.    [JG]   1932 Phone call with Dr. Ernesto Loyola, orthopedics.  Discussed the patient, relevant history, exam, diagnostics, ED findings/progress, and concerns.  He has reviewed imaging, states that with patient's acetabular fracture he will not be able to bear weight, will be toe-touch weightbearing only.  He recommends admission to hospitalist for pain control, physical therapy, patient will likely need rehab afterwards.        [JG]    1936 Patient reassessed.  Discussed ED findings, differential diagnosis, and the need for admission for evaluation/treatment.  They are agreeable to admission and all questions were answered.        [JG]   1948 Phone call with Dr. Huerta, neurosurgery.  Discussed the patient, relevant history, exam, diagnostics, ED findings/progress, and concerns.  He agrees to consult, request MRI of lumbar spine to be performed.        [JG]   2001 Phone call with Celeste, DA for [facility].  Discussed the patient, relevant history, exam, diagnostics, ED findings/progress, and concerns. They agree to admit the patient to [facility] under Dr. Guillermo. Care assumed by the admitting physician at this time.        [JG]      ED Course User Index  [JG] Min Glynn MD       AS OF 20:23 EST VITALS:    BP - 137/81  HR - 72  TEMP - 97.4 °F (36.3 °C) (Tympanic)  O2 SATS - 96%    DIAGNOSIS  Final diagnoses:   Closed nondisplaced fracture of posterior wall of left acetabulum, initial encounter (CMS/[facility])   Closed fracture of left iliac wing, initial encounter (CMS/[facility])   Lumbar herniated disc         DISPOSITION  ADMISSION    Discussed treatment plan and reason for admission with pt/family and admitting physician.  Pt/family voiced understanding of the plan for admission for further testing/treatment as needed.          Min Glynn MD  03/01/21 2004       Min Glynn MD  03/01/21 2024      Electronically signed by Min Glynn MD at 03/01/21 2024     Olamide Manzano, RN at 03/01/21 1729        Pt refusing pain medication at this time. Educated to let RN know if he wants anything for pain.      Olamide Manzano, RN  03/01/21 1729      Electronically signed by Olamide Manzano, LUIGI at 03/01/21 1729     Olamide Manzano, RN at 03/01/21 1923        Pt was able to ambulate 2 steps in room then had to stop because of pain. Able to stand to use urinal.      Olamide Manzano, RN  03/01/21 1924      Electronically signed by Olamide Manzano RN at  03/01/21 1924       {Outbreak/Travel/Exposure Documentation......;  Question Available Choices Patient Response   Outbreak Screen: Do you currently have a new onset of the following symptoms?        Fever/Chils, Cough, Shortness of air, Loss of taste or smell, No, Unknown  No (03/01/21 1636)   Outbreak Screen: In the last 14 days, have you had contact with anyone who is ill, has show any of the symptoms listed above and/or has been diagnosis with the 2019 Novel Coronavirus? 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  Yes, No, Unknown              No (03/01/21 1636)   Outbreak Screen: Who was notified?    Free text  (not recorded)   Travel Screen: Have you traveled in the last month? If so, to what country have you traveled? If [place] what state? Yes, No, Unknown  List of all countries  List of all States No (03/01/21 1636)  (not recorded)  (not recorded)   Infection Risk: Do you currently have the following symptoms?  (If cough is selected, the Tuberculosis Screen is performed.) Cough, Fever, Rash, No No (03/01/21 1636)   Tuberculosis Screen: Do you have any of the following Tuberculosis Risks?  · Have you lived or spent time with anyone who had or may have TB?  · Have you lived in or visited any of the following areas for more than one month: [place], [place], [place], [place] or [place], the [place] or [place]?  · Do you have HIV/AIDS?  · Have you lived in or worked in a nursing home, homeless shelter, correctional facility, or substance abuse treatment facility?   · No    If Yes do you have any of the following symptoms? Yes responses display to the right    If Yes, symptoms listed are:  Cough greater than or equal to 3 weeks, Loss of appetite, Unexplained weight loss, Night sweats, Bloody sputum or hemoptysis, Hoarseness, Fever, Fatigue, Chest pain, No (not  recorded)  (not recorded)   Exposure Screen: Have you been exposed to any of these contagious diseases in the last month? Measles, Chickenpox, Meningitis, Pertussis, Whooping Cough, No No (03/01/21 1636)       Vital Signs (last day)     Date/Time   Temp   Temp src   Pulse   Resp   BP   Patient Position   SpO2    03/03/21 1122   97.4 (36.3)   Oral   73   18   127/73   Sitting   94    03/03/21 0727   97 (36.1)   Oral   66   18   134/88   Lying   95    03/03/21 0401   96.8 (36)   Oral   71   18   124/78   Lying   92    03/02/21 1945   97.7 (36.5)   Oral   73   16   125/73   Lying   93    03/02/21 1604   97.4 (36.3)   Oral   70   16   135/92   Sitting   97    03/02/21 1133   97.3 (36.3)   Oral   78   16   126/90   Lying   96    03/02/21 0700   96.8 (36)   Oral   74   16   139/84   Lying   95    03/02/21 0334   97.1 (36.2)   Oral   68   --   148/97   Sitting   98              Oxygen Therapy (last day)     Date/Time   SpO2   Device (Oxygen Therapy)   Flow (L/min)   Oxygen Concentration (%)   ETCO2 (mmHg)    03/03/21 1122   94   room air   --   --   --    03/03/21 0727   95   room air   --   --   --    03/03/21 0401   92   room air   --   --   --    03/02/21 2214   --   nasal cannula   3   --   --    03/02/21 2008   --   room air   --   --   --    03/02/21 1945   93   room air   --   --   --    03/02/21 1604   97   room air   --   --   --    03/02/21 1133   96   room air   --   --   --    03/02/21 0845   --   room air   --   --   --    03/02/21 0700   95   room air   --   --   --    03/02/21 0334   98   room air   --   --   --              Lines, Drains & Airways    Active LDAs     Name:   Placement date:   Placement time:   Site:   Days:    Peripheral IV 03/01/21 1700 Right;Upper Forearm   03/01/21    1700    Forearm   1                  Current Facility-Administered Medications   Medication Dose Route Frequency Provider Last Rate Last Admin   • acetaminophen (TYLENOL) tablet 650 mg  650 mg Oral Q4H PRN Celeste Genao,  APRN   650 mg at 03/02/21 1004    Or   • acetaminophen (TYLENOL) 160 MG/5ML solution 650 mg  650 mg Oral Q4H PRN Celeste Genao APRN        Or   • acetaminophen (TYLENOL) suppository 650 mg  650 mg Rectal Q4H PRN Celeste Genao APRN       • enoxaparin (LOVENOX) syringe 40 mg  40 mg Subcutaneous Q12H Jake Guillermo MD   40 mg at 03/03/21 0821   • HYDROcodone-acetaminophen (NORCO) 7.5-325 MG per tablet 1 tablet  1 tablet Oral Q4H PRN Jared Rico MD   1 tablet at 03/03/21 1043   • morphine injection 2 mg  2 mg Intravenous Q2H PRN Jared Rico MD       • nicotine (NICODERM CQ) 21 MG/24HR patch 1 patch  1 patch Transdermal Q24H Celeste Genao APRN   1 patch at 03/03/21 1125   • ondansetron (ZOFRAN) injection 4 mg  4 mg Intravenous Q6H PRN Celeste Genao APRN       • sodium chloride 0.9 % flush 10 mL  10 mL Intravenous Q12H Celeste Genao APRN   10 mL at 03/03/21 0821   • sodium chloride 0.9 % flush 10 mL  10 mL Intravenous PRN Celeste Genao APRN           Orders (active)      Start     Ordered    03/03/21 0900  HYDROcodone-acetaminophen (NORCO) 7.5-325 MG per tablet 1 tablet  Every 4 Hours PRN      03/03/21 0900    03/03/21 0000  HYDROcodone-acetaminophen (NORCO) 7.5-325 MG per tablet  Every 4 Hours PRN      03/03/21 0901    03/02/21 0947  morphine injection 2 mg  Every 2 Hours PRN      03/02/21 0947    03/02/21 0800  enoxaparin (LOVENOX) syringe 40 mg  Every 12 Hours      03/02/21 0116    03/02/21 0000  Vital Signs  Every 4 Hours      03/01/21 2037 03/01/21 2230  nicotine (NICODERM CQ) 21 MG/24HR patch 1 patch  Every 24 Hours Scheduled      03/01/21 2037 03/01/21 2100  sodium chloride 0.9 % flush 10 mL  Every 12 Hours Scheduled      03/01/21 2037 03/01/21 2040  PT Consult: Eval & Treat As Tolerated; Discharge Placement Assessment  Once      03/01/21 2040 03/01/21 2037  ondansetron (ZOFRAN) injection 4 mg  Every 6 Hours PRN      03/01/21 2037 03/01/21 2037   "Code Status and Medical Interventions:  Continuous      21  Intake & Output  Every Shift      21  Oxygen Therapy- Nasal Cannula; Titrate for SPO2: 90%  Continuous      21  Insert Peripheral IV  Once      21  Diet Regular; Cardiac  Diet Effective Now      21  sodium chloride 0.9 % flush 10 mL  As Needed      21  acetaminophen (TYLENOL) tablet 650 mg  Every 4 Hours PRN      21  acetaminophen (TYLENOL) 160 MG/5ML solution 650 mg  Every 4 Hours PRN      21  acetaminophen (TYLENOL) suppository 650 mg  Every 4 Hours PRN      21    Unscheduled  Up With Assistance  As Needed      21                     Physician Progress Notes       Jared Rico MD at 21 1006           LOS: 2 days     Name: [facility]  Age: 45 y.o.  Sex: male  :  1975  MRN: [id number]         Primary Care Physician: Provider, No Known    Subjective   Subjective  Left hip pain not yet well controlled.    Objective   Vital Signs  Temp:  [96.8 °F (36 °C)-97.7 °F (36.5 °C)] 97 °F (36.1 °C)  Heart Rate:  [66-78] 66  Resp:  [16-18] 18  BP: (124-135)/(73-92) 134/88  Body mass index is 50.14 kg/m².    Objective:  General Appearance:  Comfortable, in no acute distress and in pain.    Vital signs: (most recent): Blood pressure 134/88, pulse 66, temperature 97 °F (36.1 °C), temperature source Oral, resp. rate 18, height 177.8 cm (70\"), weight (!) 159 kg (349 lb 6.9 oz), SpO2 95 %.    Lungs:  Normal effort and normal respiratory rate.    Heart: Normal rate.  Regular rhythm.    Abdomen: Abdomen is soft.  Bowel sounds are normal.   There is no abdominal tenderness.     Extremities: There is no dependent edema or local swelling.    Neurological: Patient is alert and oriented to person, place and time.  "   Skin:  Warm and dry.              Results Review:       I reviewed the patient's new clinical results.    Results from last 7 days   Lab Units 03/02/21  0608 03/01/21  1652   WBC 10*3/mm3 9.59 12.12*   HEMOGLOBIN g/dL 14.2 14.8   PLATELETS 10*3/mm3 339 339     Results from last 7 days   Lab Units 03/02/21  0608 03/01/21  1652   SODIUM mmol/L 135* 136   POTASSIUM mmol/L 4.0 4.3   CHLORIDE mmol/L 101 100   CO2 mmol/L 24.0 26.1   BUN mg/dL 12 14   CREATININE mg/dL 0.74* 0.85   CALCIUM mg/dL 9.1 9.3   GLUCOSE mg/dL 100* 77     Results from last 7 days   Lab Units 03/01/21  1652   INR  1.09             Scheduled Meds:   enoxaparin, 40 mg, Subcutaneous, Q12H  nicotine, 1 patch, Transdermal, Q24H  sodium chloride, 10 mL, Intravenous, Q12H      PRN Meds:   •  acetaminophen **OR** acetaminophen **OR** acetaminophen  •  HYDROcodone-acetaminophen  •  Morphine  •  ondansetron  •  sodium chloride  Continuous Infusions:       Assessment/Plan   Active Hospital Problems    Diagnosis  POA   • **Closed nondisplaced fracture of posterior wall of left acetabulum (CMS/HCC) [S32.425A]  Yes   • Lumbar herniated disc [M51.26]  Yes      Resolved Hospital Problems   No resolved problems to display.       Assessment & Plan    -Orthopedic surgery recommends nonoperative management of his acetabular fracture with toe-touch weightbearing for the next 6 weeks followed by partial weightbearing for 6 weeks thereafter.  -Pain not yet well controlled.  Increase Norco dose today.  -Neurosurgery holding off on MRI and can follow him up in the outpatient setting as needed  -Physical therapy recommending SNF  -CCP for discharge planning.  He can be discharged once arranged.      I wore full protective equipment throughout the patient encounter including eye protection and facemask.  Hand hygiene was performed before donning protective equipment and after removal when leaving the room.    Jared Rico MD  [facility]  21  10:06 EST      Electronically signed by Jared Rcio MD at 21 1008     Jared Rico MD at 21 0947           LOS: 1 day     Name: Festus Milton  Age: 45 y.o.  Sex: male  :  1975  MRN: [id number]         Primary Care Physician: Provider, No Known    Subjective   Subjective  Complains of pain in his left hip.  Has only taken Tylenol since admission and not currently well controlled.  Has significant pain if he tries to move around in the bed at all.    Objective   Vital Signs  Temp:  [96.8 °F (36 °C)-97.6 °F (36.4 °C)] 96.8 °F (36 °C)  Heart Rate:  [68-84] 74  Resp:  [14-20] 16  BP: (124-161)/(78-97) 139/84  Body mass index is 50.22 kg/m².    Objective:  General Appearance:  Comfortable and in no acute distress.    Vital signs: (most recent): Blood pressure 139/84, pulse 74, temperature 96.8 °F (36 °C), temperature source Oral, resp. rate 16, height 177.8 cm (70\"), weight (!) 159 kg (350 lb), SpO2 95 %.    Lungs:  Normal effort and normal respiratory rate.    Heart: Normal rate.  Regular rhythm.    Abdomen: Abdomen is soft.  Bowel sounds are normal.   There is no abdominal tenderness.     Extremities: There is no dependent edema or local swelling.    Neurological: Patient is alert and oriented to person, place and time.    Skin:  Warm and dry.              Results Review:       I reviewed the patient's new clinical results.    Results from last 7 days   Lab Units 21  0608 21  1652   WBC 10*3/mm3 9.59 12.12*   HEMOGLOBIN g/dL 14.2 14.8   PLATELETS 10*3/mm3 339 339     Results from last 7 days   Lab Units 21  0608 21  1652   SODIUM mmol/L 135* 136   POTASSIUM mmol/L 4.0 4.3   CHLORIDE mmol/L 101 100   CO2 mmol/L 24.0 26.1   BUN mg/dL 12 14   CREATININE mg/dL 0.74* 0.85   CALCIUM mg/dL 9.1 9.3   GLUCOSE mg/dL 100* 77     Results from last 7 days   Lab Units 21  1652   INR  1.09             Scheduled Meds:   enoxaparin, 40 mg, " Subcutaneous, Q12H  nicotine, 1 patch, Transdermal, Q24H  sodium chloride, 10 mL, Intravenous, Q12H      PRN Meds:   •  acetaminophen **OR** acetaminophen **OR** acetaminophen  •  HYDROcodone-acetaminophen  •  Morphine  •  ondansetron  •  Pharmacy to Dose enoxaparin (LOVENOX)  •  sodium chloride  Continuous Infusions:  Pharmacy to Dose enoxaparin (LOVENOX),         Assessment/Plan   Active Hospital Problems    Diagnosis  POA   • **Closed nondisplaced fracture of posterior wall of left acetabulum (CMS/HCC) [S32.425A]  Yes   • Lumbar herniated disc [M51.26]  Yes   • Obesity (BMI 30-39.9) [E66.9]  Yes      Resolved Hospital Problems   No resolved problems to display.       Assessment & Plan    -Orthopedic surgery and neurosurgery have been consulted for recommendations regarding the acetabular fracture and herniated lumbar disc, respectively.  -Pain control with combination of oral and IV.  -Leukocytosis yesterday was reactive and now resolved  -PT. Weightbearing restrictions per surgical services  -Lovenox for DVT prophylaxis    Dispo  To be determined.  CCP to assist.  May need rehab.    I wore full protective equipment throughout the patient encounter including eye protection and facemask.  Hand hygiene was performed before donning protective equipment and after removal when leaving the room.    Jared Rico MD  [facility]  03/02/21  09:48 EST      Electronically signed by Jared Rico MD at 03/02/21 0970          Consult Notes       Roscoe Loyola II, MD at 03/02/21 1054      Consult Orders    1. Inpatient Orthopedic Surgery Consult [[id number]] ordered by Jared Rico MD at 03/02/21 0841                 Orthopaedic Surgery  Consult Note  Dr. HIRSCH “Ernesto” Nir FERNANDEZ  [phone]    HPI:  Patient is a 45 y.o. Not  or  male who presents with left hip pain after a mechanical fall while at work.  He presented to the hospital where a CT  "scan demonstrated a left nondisplaced posterior wall acetabular fracture.  He was made toe-touch weightbearing and admitted for pain control and mobilization.  Patient has a BMI over 50 and this does complicate his mobilization efforts.  He complains of sharp pains with any attempted range of motion or weight on the left leg.    MEDICAL HISTORY  Past Medical History:   Diagnosis Date   • Allergic    • Injury of back    ·   · No past surgical history on file.  Prior to Admission medications    Not on File   ·   Allergies   Allergen Reactions   • Codeine Provider Review Needed   ·   ·   · There is no immunization history on file for this patient.  Social History     Tobacco Use   • Smoking status: Current Every Day Smoker   Substance Use Topics   • Alcohol use: No   ·    Social History     Substance and Sexual Activity   Drug Use No   ·     VITALS: /84 (BP Location: Left arm, Patient Position: Lying)   Pulse 74   Temp 96.8 °F (36 °C) (Oral)   Resp 16   Ht 177.8 cm (70\")   Wt (!) 159 kg (350 lb)   SpO2 95%   BMI 50.22 kg/m²  Body mass index is 50.22 kg/m².    PHYSICAL EXAM:   · CONSTITUTIONAL: No acute distress  · LUNGS: Equal chest rise, no shortness of air  · CARDIOVASCULAR: palpable peripheral pulses  · SKIN: no skin lesions in the area examined  · LYMPH: no lymphadenopathy in the area examined  · EXTREMITY: Left Lower Extremity  · Tenderness to Palpation: Pain with passive leg roll  · Gross Deformity: No Gross Deformity  · Pulses:  Brisk Capillary Refill  · Sensation: Intact to Saphenous, Sural, Deep Peroneal, Superficial Peroneal, and Tibial Nerves and grossly throughout extremity  · Motor: 5/5 EHL/FHL/TA/GS motor complexes    RADIOLOGY REVIEW:   Ct Lumbar Spine Without Contrast    Result Date: 3/2/2021  The study is somewhat compromised by technical considerations regarding patient body habitus.  At L4-5, there is a left central/left subarticular disc protrusion which is within the position of " causing mass effect upon the descending left L5 nerve root sleeve.  The spinal canal is relatively narrow on a congenital basis with somewhat shortened pedicles contributing to some canal narrowing from L2-3 down to L4-5. Additionally, there is a disc osteophyte complex at the L3-4 level and these findings result in a moderate degree of canal stenosis at L3-4 in addition to moderate bilateral foraminal narrowing at the L3-4 level.  The additional multilevel degenerative phenomena within the lumbar spine are as discussed in detail above. Given the constraints of the quality of the exam due to the patient's body habitus, further evaluation of these abnormalities could be performed with MR imaging for more sensitive and specific assessment of spine pathology. These findings and recommendations were discussed with Min Glynn on 03/01/2021 at approximately 7 PM.  Radiation dose reduction techniques were utilized, including automated exposure control and exposure modulation based on body size.  This report was finalized on 3/2/2021 6:20 AM by Dr. Lawrence Latham M.D.        LABS:   Results for the past 24 hours:   Recent Results (from the past 24 hour(s))   Light Blue Top    Collection Time: 03/01/21  4:52 PM   Result Value Ref Range    Extra Tube hold for add-on    Green Top (Gel)    Collection Time: 03/01/21  4:52 PM   Result Value Ref Range    Extra Tube Hold for add-ons.    Lavender Top    Collection Time: 03/01/21  4:52 PM   Result Value Ref Range    Extra Tube hold for add-on    Gold Top - SST    Collection Time: 03/01/21  4:52 PM   Result Value Ref Range    Extra Tube Hold for add-ons.    Comprehensive Metabolic Panel    Collection Time: 03/01/21  4:52 PM    Specimen: Blood   Result Value Ref Range    Glucose 77 65 - 99 mg/dL    BUN 14 6 - 20 mg/dL    Creatinine 0.85 0.76 - 1.27 mg/dL    Sodium 136 136 - 145 mmol/L    Potassium 4.3 3.5 - 5.2 mmol/L    Chloride 100 98 - 107 mmol/L    CO2 26.1 22.0 - 29.0 mmol/L     Calcium 9.3 8.6 - 10.5 mg/dL    Total Protein 7.4 6.0 - 8.5 g/dL    Albumin 4.10 3.50 - 5.20 g/dL    ALT (SGPT) 33 1 - 41 U/L    AST (SGOT) 24 1 - 40 U/L    Alkaline Phosphatase 68 39 - 117 U/L    Total Bilirubin 0.4 0.0 - 1.2 mg/dL    eGFR Non African Amer 98 >60 mL/min/1.73    Globulin 3.3 gm/dL    A/G Ratio 1.2 g/dL    BUN/Creatinine Ratio 16.5 7.0 - 25.0    Anion Gap 9.9 5.0 - 15.0 mmol/L   Protime-INR    Collection Time: 03/01/21  4:52 PM    Specimen: Blood   Result Value Ref Range    Protime 13.9 11.7 - 14.2 Seconds    INR 1.09 0.90 - 1.10   aPTT    Collection Time: 03/01/21  4:52 PM    Specimen: Blood   Result Value Ref Range    PTT 31.6 22.7 - 35.4 seconds   CBC Auto Differential    Collection Time: 03/01/21  4:52 PM    Specimen: Blood   Result Value Ref Range    WBC 12.12 (H) 3.40 - 10.80 10*3/mm3    RBC 5.23 4.14 - 5.80 10*6/mm3    Hemoglobin 14.8 13.0 - 17.7 g/dL    Hematocrit 45.1 37.5 - 51.0 %    MCV 86.2 79.0 - 97.0 fL    MCH 28.3 26.6 - 33.0 pg    MCHC 32.8 31.5 - 35.7 g/dL    RDW 12.9 12.3 - 15.4 %    RDW-SD 39.4 37.0 - 54.0 fl    MPV 12.2 (H) 6.0 - 12.0 fL    Platelets 339 140 - 450 10*3/mm3    Neutrophil % 64.7 42.7 - 76.0 %    Lymphocyte % 24.5 19.6 - 45.3 %    Monocyte % 7.8 5.0 - 12.0 %    Eosinophil % 1.7 0.3 - 6.2 %    Basophil % 0.7 0.0 - 1.5 %    Immature Grans % 0.6 (H) 0.0 - 0.5 %    Neutrophils, Absolute 7.84 (H) 1.70 - 7.00 10*3/mm3    Lymphocytes, Absolute 2.97 0.70 - 3.10 10*3/mm3    Monocytes, Absolute 0.94 (H) 0.10 - 0.90 10*3/mm3    Eosinophils, Absolute 0.21 0.00 - 0.40 10*3/mm3    Basophils, Absolute 0.09 0.00 - 0.20 10*3/mm3    Immature Grans, Absolute 0.07 (H) 0.00 - 0.05 10*3/mm3    nRBC 0.0 0.0 - 0.2 /100 WBC   COVID-19,[facility], NP/OP [facility] IN UTM/VTM/SALINE TRANSPORT MEDIA,24 HR TAT - Swab, Nasopharynx    Collection Time: 03/01/21  7:58 PM    Specimen: Nasopharynx; Swab   Result Value Ref Range    COVID19 Not Detected Not Detected - Ref. Range   Basic  "Metabolic Panel    Collection Time: 21  6:08 AM    Specimen: Arm, Left; Blood   Result Value Ref Range    Glucose 100 (H) 65 - 99 mg/dL    BUN 12 6 - 20 mg/dL    Creatinine 0.74 (L) 0.76 - 1.27 mg/dL    Sodium 135 (L) 136 - 145 mmol/L    Potassium 4.0 3.5 - 5.2 mmol/L    Chloride 101 98 - 107 mmol/L    CO2 24.0 22.0 - 29.0 mmol/L    Calcium 9.1 8.6 - 10.5 mg/dL    eGFR Non African Amer 115 >60 mL/min/1.73    BUN/Creatinine Ratio 16.2 7.0 - 25.0    Anion Gap 10.0 5.0 - 15.0 mmol/L   CBC (No Diff)    Collection Time: 21  6:08 AM    Specimen: Arm, Left; Blood   Result Value Ref Range    WBC 9.59 3.40 - 10.80 10*3/mm3    RBC 5.19 4.14 - 5.80 10*6/mm3    Hemoglobin 14.2 13.0 - 17.7 g/dL    Hematocrit 45.5 37.5 - 51.0 %    MCV 87.7 79.0 - 97.0 fL    MCH 27.4 26.6 - 33.0 pg    MCHC 31.2 (L) 31.5 - 35.7 g/dL    RDW 12.9 12.3 - 15.4 %    RDW-SD 40.9 37.0 - 54.0 fl    MPV 12.2 (H) 6.0 - 12.0 fL    Platelets 339 140 - 450 10*3/mm3       IMPRESSION:  Patient is a 45 y.o. Not  or  male with left posterior wall acetabular fracture, nondisplaced    PLAN:   · Admited to: Jake Guillermo MD  · Disposition: Plan nonoperative treatment of this injury.  Toe-touch weightbearing for 6 weeks followed by partial weightbearing for 6 weeks.  Physical therapy for mobilization.  Based on his BMI he very well may need rehab placement.  We will see how he does with therapy first.    R \"Ernesto\" Nir FERNANDEZ MD  Orthopaedic Surgery  [facility]  [phone] - [place] Office  [phone] - [place] Office              Electronically signed by Roscoe Loyola II, MD at 21 [phone]     Kary Garcia APRN at 21 1001      Consult Orders    1. Inpatient Neurosurgery Consult [[id number]] ordered by Celeste Genao APRN                 [facility]   Consult Note    Patient Name: [facility] Yoan  : 1975  MRN: [id number]  Primary Care Physician: Provider, No Known  Referring " Physician: Jake Guillermo MD  Date of admission: 3/1/2021    Inpatient Neurosurgery Consult  Consult performed by: Kary Garcia APRN  Consult ordered by: Celeste Genao APRN  Reason for consult: lumbar L4/5 disc bulge noted on lumbar CT        Subjective   Subjective     Reason for Consult/ Chief Complaint: Left hip pain    History of Present Illness -this is a very pleasant obese 45-year-old male who works as an .  While while working at a job site, he was picking up some stray pieces of wood and went to dispose of them in a pile.  As he turned to his left to go toward the pile his boot got hung up on a piece of rebar and he subsequently fell landing on his left hip.  He had intense pain in the left hip so much so that it took several minutes before he could even attempt to stand with the assistance of 2 coworkers.  Once he stood he realized that he could not bear any weight on his left foot and proceeded to stand there to get his bearings.  A coworker eventually picked him up gently with the front  he was operating and took him toward his van.  The patient was not able to stand but was not able to get into his van.  He contacted a supervisor and was subsequently brought to the emergency room in an ambulance.  He describes the pain as sharp and severe.  Initially the pain had radiated down the upper aspect of his thigh but that pain has since resolved.  He also states that initially he had experienced some numbness and tingling in his left great and second toe but that has since resolved.  He states that he has had issues with his back in the past.  He was imaged and had some evidence of lumbar disc bulges but no symptoms eventually resolved.  He denies any bowel or bladder incontinence.  He denies any weakness except for what he is experiencing in his left iliopsoas and quadricep muscles related to the acetabular fracture that was discovered on work-up.  The patient is sitting up in a chair  this morning.  He states that he is more comfortable than he was yesterday.  The patient was sent for an MRI of the lumbar spine without contrast but unfortunately could not fit in the machine.  CT scan of the lumbar spine was performed and will be discussed further below.  Neurosurgery has been asked to give recommendations regarding those results.      Review of Systems   Constitutional: Positive for activity change. Negative for fever.   Eyes: Negative for visual disturbance.   Respiratory: Negative for shortness of breath.    Cardiovascular: Negative for chest pain.   Gastrointestinal: Negative for nausea and vomiting.   Genitourinary: Negative for difficulty urinating, frequency, testicular pain and urgency.   Musculoskeletal: Positive for back pain (Chronic back pain which patient states is manageable. ), gait problem (Difficulty with bearing weight to walk given recent L acetabular fracture) and myalgias.   Skin: Negative for wound.   Neurological: Positive for weakness (L hip/quad related to recent injury). Negative for dizziness, numbness and headaches.        Personal History     Past Medical History:   Diagnosis Date   • Allergic    • Injury of back        No past surgical history on file.    Family History: family history is not on file. Otherwise pertinent FHx was reviewed and not pertinent to current issue.    Social History:  reports that he has been smoking. He does not have any smokeless tobacco history on file. He reports that he does not drink alcohol or use drugs.    Home Medications:     The patient takes no routine medications at home.    Allergies:  Allergies   Allergen Reactions   • Codeine Provider Review Needed       Objective    Objective   Vitals:  Temp:  [96.8 °F (36 °C)-97.6 °F (36.4 °C)] 96.8 °F (36 °C)  Heart Rate:  [68-84] 74  Resp:  [14-20] 16  BP: (124-161)/(78-97) 139/84    Physical Exam  Vitals signs reviewed.   Constitutional:       General: He is not in acute distress.      Appearance: He is well-developed. He is obese. He is not toxic-appearing or diaphoretic.   HENT:      Head: Normocephalic and atraumatic.      Mouth/Throat:      Mouth: Mucous membranes are moist.      Pharynx: Oropharynx is clear.   Eyes:      General:         Right eye: No discharge.         Left eye: No discharge.      Conjunctiva/sclera: Conjunctivae normal.   Neck:      Musculoskeletal: Normal range of motion and neck supple.      Trachea: No tracheal deviation.   Cardiovascular:      Rate and Rhythm: Normal rate.   Pulmonary:      Effort: Pulmonary effort is normal. No respiratory distress.   Abdominal:      General: There is no distension.      Palpations: Abdomen is soft.      Tenderness: There is no abdominal tenderness.   Musculoskeletal: Normal range of motion.         General: Tenderness (L hip/acetabular region) and signs of injury (L acetabular fracture) present.   Skin:     General: Skin is warm and dry.      Findings: No bruising or erythema.   Neurological:      Mental Status: He is alert and oriented to person, place, and time.      GCS: GCS eye subscore is 4. GCS verbal subscore is 5. GCS motor subscore is 6.      Sensory: No sensory deficit.      Motor: Weakness present. No abnormal muscle tone.      Coordination: Coordination normal.      Deep Tendon Reflexes: Reflexes are normal and symmetric. Reflexes normal.      Comments: AA&O x 3.  Speech is normal.  Converses appropriately.  Able to relate the events leading up to his hospitalization without difficulty. PERRL. Face symmetric. Tongue midline without fasiculations or atrophy. No motor or sensory deficits.  Except for left iliopsoas and left quadricep weakness of 4/5. DTR's normal. Negative Marshall's; negative clonus. SLR negative bilaterally.     Psychiatric:         Behavior: Behavior is cooperative.         Thought Content: Thought content normal.       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  There is a central to left disc protrusion noted at L4-5 with some mass-effect on the left L5 nerve root with mild to moderate foraminal narrowing.  Degenerative retrolisthesis of L5 on S1 by 3 mm with mild bilateral foraminal narrowing.    CT pelvis w/o contrast 3/1/2021    Nondisplaced fracture extending through the posterior lateral margin of the left acetabulum.  Minimal fracture noted of the left iliac bone inferior to the sacroiliac joint.  The proximal femurs are intact.    .  Results from last 7 days   Lab Units 03/02/21  0608 03/01/21  1652   WBC 10*3/mm3 9.59 12.12*   HEMOGLOBIN g/dL 14.2 14.8   HEMATOCRIT % 45.5 45.1   PLATELETS 10*3/mm3 339 339     .  Results from last 7 days   Lab Units 03/02/21  0608 03/01/21  1652   SODIUM mmol/L 135* 136   POTASSIUM mmol/L 4.0 4.3   CHLORIDE mmol/L 101 100   CO2 mmol/L 24.0 26.1   BUN mg/dL 12 14   CREATININE mg/dL 0.74* 0.85   GLUCOSE mg/dL 100* 77   CALCIUM mg/dL 9.1 9.3     .  Results from last 7 days   Lab Units 03/01/21  1652   INR  1.09   APTT seconds 31.6         Assessment/Plan   Assessment / Plan     Brief Patient Summary:  Festus Milton is a 45 y.o. male who was injured 3/1/2021 while working as an  at a job site.  He was picking up strain wood/trash and turned to the left catching his boot on rebar.  He subsequently fell and struck his left hip on the ground.  He has significant left hip pain related to the acetabular fracture that was discovered on CT pelvis.  There is also a CT of the lumbar spine which revealed some degenerative stenosis in the lumbar spine with  some canal narrowing at L3-4 and a left sided disc bulge at L4-5.  The patient states that he has had issues with bulging disks in the past and those are currently not bothering him.    Active Hospital Problems:  Active Hospital Problems    Diagnosis   • **Closed nondisplaced fracture of posterior wall of left acetabulum (CMS/HCC)   • Lumbar herniated disc   • Obesity (BMI 30-39.9)       Plan:     Discussed the above findings with Dr. Huerta who is also aware that the patient was unable to complete the lumbar MRI secondary to his size.  The patient states that his symptoms are currently all related to the acetabular fracture.  Given the history of known disc bulges in the lumbar spine and the fact that there is no weakness on exam indicative of severe radiculopathy, Dr. Huerta is comfortable holding off on the MRI for now.  The patient was instructed to contact our office if he begins to develop any worsening symptoms in his low back or radicular symptoms in his leg.  We will keep it open-ended from here.      Electronically signed by CAITLYN Sprague, 03/02/21, 10:01 AM EST.    Electronically signed by Kary Garcia APRN at 03/02/21 1733           Shannan Menard CSW      Case Management   Progress Notes   Signed   Date of Service:  03/02/21 0947   Creation Time:  03/02/21 0947            Signed             Continued Stay Note  [facility]     Patient Name: Festus Milton                 MRN: [id number]  Today's Date: 3/2/2021                       Admit Date: 3/1/2021          Discharge Plan      Row Name 03/02/21 0944           Plan     Plan  Pending SNF referrals     Plan Comments  CCP provided SNF medicare ratings; patient agreeable to CCP making referrals to facilities that may work with Worker's comp and have three star rating. Additional referrals placed in [facility]. Patient states he has not received worker's comp  information at this time. CCP encouraged patient to  update CCP if he obtains worker's comp contact information. Gloria VALENTIN     Row Name 03/02/21 0903           Plan     Plan  Pending SNF referrals     Patient/Family in Agreement with Plan  yes     Plan Comments  CCP met with patient at bedside. CCP role explained and discharge planning discussed. Face sheet verified. Patient lives with his spouse, with two steps to the entrance of the home. Patient’s bedroom is upstairs (13 steps-handrails present). Patient does have a pull out couch on the main level he could utilize if needed. Patient is independent with his ADLs prior to admission. Patient has not been to SNF or used home health. Patient’s preferred pharmacy is [facility] on [address]. CCP discussed possible SNF at discharge. CCP reviewed SNF list with patient, patient agreeable to referrals to [facility] and [facility]. CCP provided Medicare quality ratings to patient. CCP will follow for PT eval and follow for pending SNF referrals. Gloria VALENTIN          Discharge Codes    No documentation.                  BARBIE Coello

## 2021-03-03 NOTE — PLAN OF CARE
Goal Outcome Evaluation:  Plan of Care Reviewed With: patient  Progress: improving  Outcome Summary: Pt tolerated treatment with c/o left hip pain. Pt is Colette with sit<->stand transfers using arm rest from chair to stand. Pt ambulated 5ft with Colette/CGAX2. pt able to maintain TTWB precautions throught out treatment. Pt unable to ambulate further due to increased pain. Pt performed a couple bilateral LE exercises. Will continue to progress pt as tolerated.  ..Patient was wearing a face mask during this therapy encounter. Therapist used appropriate personal protective equipment including eye protection, mask, and gloves.  Mask used was standard procedure mask. Appropriate PPE was worn during the entire therapy session. Hand hygiene was completed before and after therapy session. Patient is not in enhanced droplet precautions.

## 2021-03-04 PROCEDURE — 97116 GAIT TRAINING THERAPY: CPT

## 2021-03-04 PROCEDURE — 25010000002 ENOXAPARIN PER 10 MG: Performed by: STUDENT IN AN ORGANIZED HEALTH CARE EDUCATION/TRAINING PROGRAM

## 2021-03-04 RX ADMIN — SODIUM CHLORIDE, PRESERVATIVE FREE 10 ML: 5 INJECTION INTRAVENOUS at 07:23

## 2021-03-04 RX ADMIN — Medication 1 PATCH: at 10:52

## 2021-03-04 RX ADMIN — ENOXAPARIN SODIUM 40 MG: 40 INJECTION SUBCUTANEOUS at 19:54

## 2021-03-04 RX ADMIN — HYDROCODONE BITARTRATE AND ACETAMINOPHEN 1 TABLET: 7.5; 325 TABLET ORAL at 19:53

## 2021-03-04 RX ADMIN — SODIUM CHLORIDE, PRESERVATIVE FREE 10 ML: 5 INJECTION INTRAVENOUS at 19:54

## 2021-03-04 RX ADMIN — HYDROCODONE BITARTRATE AND ACETAMINOPHEN 1 TABLET: 7.5; 325 TABLET ORAL at 14:11

## 2021-03-04 RX ADMIN — HYDROCODONE BITARTRATE AND ACETAMINOPHEN 1 TABLET: 7.5; 325 TABLET ORAL at 02:58

## 2021-03-04 RX ADMIN — HYDROCODONE BITARTRATE AND ACETAMINOPHEN 1 TABLET: 7.5; 325 TABLET ORAL at 07:23

## 2021-03-04 RX ADMIN — ENOXAPARIN SODIUM 40 MG: 40 INJECTION SUBCUTANEOUS at 07:23

## 2021-03-04 NOTE — PLAN OF CARE
Goal Outcome Evaluation:     Progress: improving  Outcome Summary: Pt AOx4, complaining of pain in left hip well controlled with oral pain med, IV saline locked, tolerating food no nausea. Pt up in the chair most of the day worked with physical therapy. Currently awaiting rehab placement. VSS no acute needs

## 2021-03-04 NOTE — PLAN OF CARE
Goal Outcome Evaluation:  Plan of Care Reviewed With: (P) patient  Progress: (P) improving  Outcome Summary: (P) pt improving as evidence by increasing ambulation distance to 30ft with CGA using FWW. pt maintaining TTW precuations during transfers and ambulation. pt performed STS transfers with CGA. continue to progress patient as tolerated.  Pt wore mask. Therapist wore appropriate PPE and standard mask. Pt was not enhanced droplet precautions.

## 2021-03-04 NOTE — PLAN OF CARE
Goal Outcome Evaluation:  Plan of Care Reviewed With: patient  Progress: no change  Outcome Summary: Pt is alert and oriented; PRN pain medicine given for Left hip pain; Pt up in the chair for most of the night;Possible discharge to rehab soon; will continue to monitor

## 2021-03-04 NOTE — PROGRESS NOTES
Name: Festus Milton ADMIT: 3/1/2021   : 1975  PCP: Provider, No Known    MRN: [id number] LOS: 3 days   AGE/SEX: 45 y.o. male  ROOM: [facility]     Subjective   Subjective   Feels like his pain is better and he is able to move his left leg better.    Review of Systems   Constitutional: Negative for fever.   Respiratory: Negative for cough and shortness of breath.    Cardiovascular: Negative for chest pain.   Gastrointestinal: Negative for abdominal pain.      Objective   Objective   Vital Signs  Temp:  [97 °F (36.1 °C)-97.7 °F (36.5 °C)] 97.4 °F (36.3 °C)  Heart Rate:  [60-73] 67  Resp:  [16-18] 16  BP: (125-158)/(78-95) 130/83  SpO2:  [95 %-99 %] 96 %  on   ;   Device (Oxygen Therapy): room air  Body mass index is 50.14 kg/m².    Physical Exam  Vitals signs and nursing note reviewed.   Constitutional:       General: He is not in acute distress.  Cardiovascular:      Rate and Rhythm: Normal rate and regular rhythm.   Pulmonary:      Effort: Pulmonary effort is normal. No respiratory distress.   Abdominal:      Palpations: Abdomen is soft.   Musculoskeletal:         General: No swelling.   Skin:     General: Skin is warm and dry.   Neurological:      Mental Status: He is alert and oriented to person, place, and time.   Psychiatric:         Mood and Affect: Mood normal.         Behavior: Behavior normal.     Results Review  I reviewed the patient's new clinical results.  Results from last 7 days   Lab Units 21  0608 21  1652   WBC 10*3/mm3 9.59 12.12*   HEMOGLOBIN g/dL 14.2 14.8   PLATELETS 10*3/mm3 339 339     Results from last 7 days   Lab Units 21  0608 21  1652   SODIUM mmol/L 135* 136   POTASSIUM mmol/L 4.0 4.3   CHLORIDE mmol/L 101 100   CO2 mmol/L 24.0 26.1   BUN mg/dL 12 14   CREATININE mg/dL 0.74* 0.85   GLUCOSE mg/dL 100* 77     Estimated Creatinine Clearance: 192.8 mL/min (A) (by C-G formula based on SCr of 0.74 mg/dL (L)).    Results from last 7 days   Lab Units 21  0913      ALBUMIN g/dL 4.10   BILIRUBIN mg/dL 0.4   ALK PHOS U/L 68   AST (SGOT) U/L 24   ALT (SGPT) U/L 33     Results from last 7 days   Lab Units 03/02/21  0608 03/01/21  1652   CALCIUM mg/dL 9.1 9.3   ALBUMIN g/dL  --  4.10       COVID19   Date Value Ref Range Status   03/01/2021 Not Detected Not Detected - Ref. Range Final     No results found for: HGBA1C, POCGLU    Scheduled Meds  enoxaparin, 40 mg, Subcutaneous, Q12H  nicotine, 1 patch, Transdermal, Q24H  sodium chloride, 10 mL, Intravenous, Q12H    Continuous Infusions   PRN Meds  •  acetaminophen **OR** acetaminophen **OR** acetaminophen  •  HYDROcodone-acetaminophen  •  Morphine  •  ondansetron  •  sodium chloride     Diet  Diet Regular; Cardiac     Assessment/Plan     Active Hospital Problems    Diagnosis  POA   • **Closed nondisplaced fracture of posterior wall of left acetabulum (CMS/HCC) [S32.425A]  Yes   • Lumbar herniated disc [M51.26]  Yes      Resolved Hospital Problems   No resolved problems to display.     45 y.o. male admitted with Closed nondisplaced fracture of posterior wall of left acetabulum (CMS/HCC).    · Left acetabular fracture   · Nonoperative treatment  · Toe-touch weightbearing 6 weeks followed by partial weightbearing for 6 weeks  · PT  · Rehab placement  · Degenerative stenosis lumbar spine, canal narrowing, left-sided disc bulge  · He will contact neurosurgery office if he begins to develop any worsening low back pain, radicular symptoms  · Lovenox 40 mg SC daily for DVT prophylaxis.  · Full code.  · Discussed with patient, nursing staff and CCP  · Anticipate discharge to SNU facility timing yet to be determined.  Waiting on Worker's Compensation    Mitchel Ruffin MD  [facility]  03/04/21  16:11 EST    I wore protective equipment throughout this patient encounter including a face mask, gloves, and protective eyewear.  Hand hygiene was performed before donning protective equipment and after removal when leaving the  room.

## 2021-03-04 NOTE — THERAPY TREATMENT NOTE
Patient Name: Festus Milton  : 1975    MRN: [id number]                              Today's Date: 3/4/2021       Admit Date: 3/1/2021    Visit Dx:     ICD-10-CM ICD-9-CM   1. Closed nondisplaced fracture of posterior wall of left acetabulum, initial encounter (CMS/[facility])  S32.425A 808.0   2. Closed fracture of left iliac wing, initial encounter (CMS/[facility])  S32.302A 808.41   3. Lumbar herniated disc  M51.26 722.10     Patient Active Problem List   Diagnosis   • Closed nondisplaced fracture of posterior wall of left acetabulum (CMS/[facility])   • Lumbar herniated disc   • Obesity (BMI 30-39.9)     Past Medical History:   Diagnosis Date   • Allergic    • Injury of back      No past surgical history on file.  General Information     Row Name 21 1608          Physical Therapy Time and Intention    Document Type  therapy note (daily note)  (Pended)   -MG     Mode of Treatment  physical therapy;individual therapy  (Pended)   -MG     Row Name 21 1608          General Information    Existing Precautions/Restrictions  fall;weight bearing  (Pended)   -MG     Row Name 21 1608          Cognition    Orientation Status (Cognition)  oriented x 3  (Pended)   -MG     Row Name 21 1608          Safety Issues, Functional Mobility    Impairments Affecting Function (Mobility)  endurance/activity tolerance;pain;range of motion (ROM)  (Pended)   -MG     Comment, Safety Issues/Impairments (Mobility)  gait belt and on skid socks used for safety.  (Pended)   -MG       User Key  (r) = Recorded By, (t) = Taken By, (c) = Cosigned By    Initials Name Provider Type    MG Komal Griffiths, PTA Student PTA Student        Mobility     Row Name 21 1608          Bed Mobility    Comment (Bed Mobility)  NT [employer]  (Pended)   -MG     Row Name 21 1608          Sit-Stand Transfer    Sit-Stand [place] (Transfers)  contact guard  (Pended)   -MG     Assistive Device (Sit-Stand Transfers)  walker, front-wheeled  (Pended)   -MG      Row Name 03/04/21 1608          Gait/Stairs (Locomotion)    [place] Level (Gait)  contact guard  (Pended)   -MG     Assistive Device (Gait)  walker, front-wheeled  (Pended)   -MG     Distance in Feet (Gait)  30ft; maintained TTWB precautions  (Pended)   -MG     Deviations/Abnormal Patterns (Gait)  antalgic;enoc decreased  (Pended)   -MG     [facility] Name 03/04/21 1608          Mobility    Extremity Weight-bearing Status  left lower extremity  (Pended)   -MG     Left Lower Extremity (Weight-bearing Status)  toe touch weight-bearing (TTWB)  (Pended)   -MG       User Key  (r) = Recorded By, (t) = Taken By, (c) = Cosigned By    Initials Name Provider Type    Komal Quan, PTA Student PTA Student        Obj/Interventions    No documentation.       Goals/Plan    No documentation.       Clinical Impression     Row Name 03/04/21 1610          Pain Scale: Numbers Pre/Post-Treatment    Pain Location - Side  Left  (Pended)   -MG     Pain Location  hip  (Pended)   -MG     Pain Intervention(s)  Ambulation/increased activity;Repositioned  (Pended)   -MG     Row Name 03/04/21 1610          Plan of Care Review    Plan of Care Reviewed With  patient  (Pended)   -MG     Progress  improving  (Pended)   -MG     Outcome Summary  pt improving as evidence by increasing ambulation distance to 30ft with CGA using FWW. pt maintaining TTW precuations during transfers and ambulation. pt performed STS transfers with CGA. continue to progress patient as tolerated.  (Pended)   -MG     Row Name 03/04/21 1610          Positioning and Restraints    Pre-Treatment Position  sitting in chair/recliner  (Pended)   -MG     Post Treatment Position  chair  (Pended)   -MG     In Chair  call light within reach;encouraged to call for assist  (Pended)   -MG       User Key  (r) = Recorded By, (t) = Taken By, (c) = Cosigned By    Initials Name Provider Type    Komal Quan, PTA Student PTA Student        Outcome Measures     Row Name 03/04/21  1612          How much help from another person do you currently need...    Turning from your back to your side while in flat bed without using bedrails?  3  (Pended)   -MG     Moving from lying on back to sitting on the side of a flat bed without bedrails?  3  (Pended)   -MG     Moving to and from a bed to a chair (including a wheelchair)?  3  (Pended)   -MG     Standing up from a chair using your arms (e.g., wheelchair, bedside chair)?  3  (Pended)   -MG     Climbing 3-5 steps with a railing?  2  (Pended)   -MG     To walk in hospital room?  2  (Pended)   -MG     AM-PAC 6 Clicks Score (PT)  16  (Pended)   -MG       User Key  (r) = Recorded By, (t) = Taken By, (c) = Cosigned By    Initials Name Provider Type    Komal Quan, PTA Student PTA Student        Physical Therapy Education                 Title: PT OT SLP Therapies (Done)     Topic: Physical Therapy (Done)     Point: Mobility training (Done)     Learning Progress Summary           Patient Acceptance, E,D, VU,DU by  at 3/4/2021 1613    Acceptance, E,D, VU,NR by  at 3/3/2021 1355    Acceptance, E,D, VU,NR by MS at 3/2/2021 0959                   Point: Home exercise program (Done)     Learning Progress Summary           Patient Acceptance, E,D, VU,DU by  at 3/4/2021 1613    Acceptance, E,D, VU,NR by  at 3/3/2021 1355    Acceptance, E,D, VU,NR by MS at 3/2/2021 0959                   Point: Body mechanics (Done)     Learning Progress Summary           Patient Acceptance, E,D, VU,DU by  at 3/4/2021 1613    Acceptance, E,D, VU,NR by  at 3/3/2021 1355    Acceptance, E,D, VU,NR by MS at 3/2/2021 0959                   Point: Precautions (Done)     Learning Progress Summary           Patient Acceptance, E,D, VU,DU by  at 3/4/2021 1613    Acceptance, E,D, VU,NR by  at 3/3/2021 1355    Acceptance, E,D, VU,NR by MS at 3/2/2021 0959                               User Key     Initials Effective Dates Name Provider Type Discipline    MS 04/03/18 -   Jude Guillermo, PT Physical Therapist PT     08/19/18 -  Mary Jiménez PTA Physical Therapy Assistant PT     02/26/21 -  Komal Griffiths PTA Student PTA Student PT              PT Recommendation and Plan     Plan of Care Reviewed With: (P) patient  Progress: (P) improving  Outcome Summary: (P) pt improving as evidence by increasing ambulation distance to 30ft with CGA using FWW. pt maintaining TTW precuations during transfers and ambulation. pt performed STS transfers with CGA. continue to progress patient as tolerated.     Time Calculation:   PT Charges     Row Name 03/04/21 1614             Time Calculation    Start Time  1531  (Pended)   -MG      Stop Time  1544  (Pended)   -MG      Time Calculation (min)  13 min  (Pended)   -MG      PT Received On  03/04/21  (Pended)   -MG      PT - Next Appointment  03/05/21  (Pended)   -MG        User Key  (r) = Recorded By, (t) = Taken By, (c) = Cosigned By    Initials Name Provider Type     Komal Griffiths PTA Student PTA Student        Therapy Charges for Today     Code Description Service Date Service Provider Modifiers Qty    33136210956 HC GAIT TRAINING EA 15 MIN 3/4/2021 Komal Griffiths PTA Student GP 1          PT G-Codes  Outcome Measure Options: AM-PAC 6 Clicks Basic Mobility (PT)  AM-PAC 6 Clicks Score (PT): (P) 16    FAM Pederson  3/4/2021

## 2021-03-04 NOTE — PROGRESS NOTES
Continued Stay Note  [facility]     Patient Name: [facility] Yoan  MRN: [id number]  Today's Date: 3/4/2021    Admit Date: 3/1/2021    Discharge Plan     Row Name 03/04/21 1340       Plan    Plan  [facility], [facility], [facility] and [facility] evaluating for SNF. Packet in office.    Patient/Family in Agreement with Plan  yes    Plan Comments  Spoke with patient at bedside regarding dc plans/needs. Informed him [facility] accepted. States [facility] is his first choice. Call s placed to Tammy/[employer]; Karyn/[facility] and Samantha/[employer] to f/u regarding worker's compensation. States they have not been able to contact . CCP sent email to Viky Gustafson/Worker's Compensation for assistance. Await call back. Continue to follow.        Discharge Codes    No documentation.             Anh Jarquin RN

## 2021-03-05 ENCOUNTER — APPOINTMENT (OUTPATIENT)
Dept: GENERAL RADIOLOGY | Facility: HOSPITAL | Age: 46
End: 2021-03-05

## 2021-03-05 PROBLEM — M25.562 LEFT KNEE PAIN: Status: ACTIVE | Noted: 2021-03-05

## 2021-03-05 PROCEDURE — 97116 GAIT TRAINING THERAPY: CPT

## 2021-03-05 PROCEDURE — 73562 X-RAY EXAM OF KNEE 3: CPT

## 2021-03-05 PROCEDURE — 25010000002 ENOXAPARIN PER 10 MG: Performed by: STUDENT IN AN ORGANIZED HEALTH CARE EDUCATION/TRAINING PROGRAM

## 2021-03-05 RX ADMIN — HYDROCODONE BITARTRATE AND ACETAMINOPHEN 1 TABLET: 7.5; 325 TABLET ORAL at 09:37

## 2021-03-05 RX ADMIN — Medication 1 PATCH: at 08:06

## 2021-03-05 RX ADMIN — ENOXAPARIN SODIUM 40 MG: 40 INJECTION SUBCUTANEOUS at 08:06

## 2021-03-05 RX ADMIN — ENOXAPARIN SODIUM 40 MG: 40 INJECTION SUBCUTANEOUS at 20:12

## 2021-03-05 RX ADMIN — HYDROCODONE BITARTRATE AND ACETAMINOPHEN 1 TABLET: 7.5; 325 TABLET ORAL at 17:35

## 2021-03-05 RX ADMIN — SODIUM CHLORIDE, PRESERVATIVE FREE 10 ML: 5 INJECTION INTRAVENOUS at 08:06

## 2021-03-05 RX ADMIN — HYDROCODONE BITARTRATE AND ACETAMINOPHEN 1 TABLET: 7.5; 325 TABLET ORAL at 04:24

## 2021-03-05 RX ADMIN — SODIUM CHLORIDE, PRESERVATIVE FREE 10 ML: 5 INJECTION INTRAVENOUS at 20:13

## 2021-03-05 NOTE — PLAN OF CARE
Goal Outcome Evaluation:     Progress: improving  Outcome Summary: VSS, minimal complaints of pain this shift, no signs of distress noted. Continue plan of care.

## 2021-03-05 NOTE — PROGRESS NOTES
"Continued Stay Note  [facility]     Patient Name: Festus Milton  MRN: [id number]  Today's Date: 3/5/2021    Admit Date: 3/1/2021    Discharge Plan     Row Name 03/05/21 1429       Plan    Plan  Disposition?    Patient/Family in Agreement with Plan  yes    Plan Comments  Received call from Kandi/[employer]/ Ph: [phone]. Informed her CCP has been attempting to reach someone to assist with providing facilities with payment/reimbursement information. States payment based on [place] \"Fee schedule\". CCP faxed documents as requested (H&P, MD progress notes, PT notes and discharge plans). Call placed to Tammy/Ronak; Karyn/Tony and Samantha/[facility] to provide contact information for . Await call back regarding approval/acceptance. Packet in office. Continue to follow.        Discharge Codes    No documentation.             Anh Jarquin RN    "

## 2021-03-05 NOTE — PLAN OF CARE
Goal Outcome Evaluation:  Plan of Care Reviewed With: (P) patient  Progress: (P) improving  Outcome Summary: (P) pt able to increase ambulation to 35ft with FWW and CGA. pt maintained TTWB precautions on left LE but denied further ambulation due to fatigue and pain. pt performed STS transfers with CGA/Barney. pt c/o of increased fatigue and pain today due to going to get x-rays done. pt performed exercises in the chair with vc'ing for quad firing. continue to progress pt as tolerated.  Pt wore mask. Therapist wore proper PPE and standard mask. Pt is not enhanced droplet precautions.

## 2021-03-05 NOTE — PROGRESS NOTES
Name: Festus Milton ADMIT: 3/1/2021   : 1975  PCP: Provider, No Known    MRN: [id number] LOS: 4 days   AGE/SEX: 45 y.o. male  ROOM: [facility]     Subjective   Subjective   pain control is OK except having some left knee pain laterally at a point.    Review of Systems   Constitutional: Negative for fever.   Respiratory: Negative for cough and shortness of breath.    Cardiovascular: Negative for chest pain.   Gastrointestinal: Negative for abdominal pain.      Objective   Objective   Vital Signs  Temp:  [96.9 °F (36.1 °C)-98.1 °F (36.7 °C)] 97.7 °F (36.5 °C)  Heart Rate:  [63-74] 67  Resp:  [16-18] 18  BP: (123-141)/(77-86) 136/81  SpO2:  [95 %-98 %] 98 %  on   ;   Device (Oxygen Therapy): room air  Body mass index is 50.14 kg/m².    Physical Exam  Vitals signs and nursing note reviewed.   Constitutional:       General: He is not in acute distress.  Cardiovascular:      Rate and Rhythm: Normal rate and regular rhythm.   Pulmonary:      Effort: Pulmonary effort is normal. No respiratory distress.   Abdominal:      Palpations: Abdomen is soft.   Musculoskeletal:         General: No swelling.      Left knee: He exhibits no swelling and no erythema. Tenderness found.      Comments: left tender spot upper lateral knee   Skin:     General: Skin is warm and dry.   Neurological:      Mental Status: He is alert and oriented to person, place, and time.   Psychiatric:         Mood and Affect: Mood normal.         Behavior: Behavior normal.     Results Review  I reviewed the patient's new clinical results.  Results from last 7 days   Lab Units 21  0608 21  1652   WBC 10*3/mm3 9.59 12.12*   HEMOGLOBIN g/dL 14.2 14.8   PLATELETS 10*3/mm3 339 339     Results from last 7 days   Lab Units 21  0608 21  1652   SODIUM mmol/L 135* 136   POTASSIUM mmol/L 4.0 4.3   CHLORIDE mmol/L 101 100   CO2 mmol/L 24.0 26.1   BUN mg/dL 12 14   CREATININE mg/dL 0.74* 0.85   GLUCOSE mg/dL 100* 77     Estimated Creatinine  Clearance: 192.8 mL/min (A) (by C-G formula based on SCr of 0.74 mg/dL (L)).    Results from last 7 days   Lab Units 03/01/21  1652   ALBUMIN g/dL 4.10   BILIRUBIN mg/dL 0.4   ALK PHOS U/L 68   AST (SGOT) U/L 24   ALT (SGPT) U/L 33     Results from last 7 days   Lab Units 03/02/21  0608 03/01/21  1652   CALCIUM mg/dL 9.1 9.3   ALBUMIN g/dL  --  4.10       COVID19   Date Value Ref Range Status   03/01/2021 Not Detected Not Detected - Ref. Range Final     No results found for: HGBA1C, POCGLU    Scheduled Meds  enoxaparin, 40 mg, Subcutaneous, Q12H  nicotine, 1 patch, Transdermal, Q24H  sodium chloride, 10 mL, Intravenous, Q12H    Continuous Infusions   PRN Meds  •  acetaminophen **OR** acetaminophen **OR** acetaminophen  •  HYDROcodone-acetaminophen  •  Morphine  •  ondansetron  •  sodium chloride     Diet  Diet Regular; Cardiac     Assessment/Plan     Active Hospital Problems    Diagnosis  POA   • **Closed nondisplaced fracture of posterior wall of left acetabulum (CMS/HCC) [S32.425A]  Yes   • Lumbar herniated disc [M51.26]  Yes      Resolved Hospital Problems   No resolved problems to display.     45 y.o. male admitted with Closed nondisplaced fracture of posterior wall of left acetabulum (CMS/HCC).    · Left acetabular fracture   · Nonoperative treatment  · Toe-touch weightbearing 6 weeks followed by partial weightbearing for 6 weeks  · PT  · Rehab placement  · check knee XR d/t pain  · Degenerative stenosis lumbar spine, canal narrowing, left-sided disc bulge  · He will contact neurosurgery office if he begins to develop any worsening low back pain, radicular symptoms  · Lovenox 40 mg SC daily for DVT prophylaxis.  · Full code.  · Discussed with patient and CCP  · Anticipate discharge to SNU facility timing yet to be determined.  Waiting on Worker's Compensation    Mitchel Ruffin MD  [facility]  03/05/21  14:31 EST    I wore protective equipment throughout this patient encounter including  a face mask, gloves, and protective eyewear.  Hand hygiene was performed before donning protective equipment and after removal when leaving the room.

## 2021-03-05 NOTE — THERAPY TREATMENT NOTE
Patient Name: Festus Milton  : 1975    MRN: [id number]                              Today's Date: 3/5/2021       Admit Date: 3/1/2021    Visit Dx:     ICD-10-CM ICD-9-CM   1. Closed nondisplaced fracture of posterior wall of left acetabulum, initial encounter (CMS/[facility])  S32.425A 808.0   2. Closed fracture of left iliac wing, initial encounter (CMS/[facility])  S32.302A 808.41   3. Lumbar herniated disc  M51.26 722.10     Patient Active Problem List   Diagnosis   • Closed nondisplaced fracture of posterior wall of left acetabulum (CMS/[facility])   • Lumbar herniated disc   • Obesity (BMI 30-39.9)   • Left knee pain     Past Medical History:   Diagnosis Date   • Allergic    • Injury of back      No past surgical history on file.  General Information     Row Name 218          Physical Therapy Time and Intention    Document Type  therapy note (daily note)  (Pended)   -     Mode of Treatment  physical therapy;individual therapy  (Pended)   -MG     Row Name 21 163          General Information    Existing Precautions/Restrictions  fall;weight bearing  (Pended)   -MG     Row Name 21 163          Cognition    Orientation Status (Cognition)  oriented x 3  (Pended)   -MG     Row Name 21 163          Safety Issues, Functional Mobility    Impairments Affecting Function (Mobility)  endurance/activity tolerance;pain;range of motion (ROM);strength  (Pended)   -MG     Comment, Safety Issues/Impairments (Mobility)  gait belt and non skid socks for safety  (Pended)   -MG       User Key  (r) = Recorded By, (t) = Taken By, (c) = Cosigned By    Initials Name Provider Type    MG Komal Griffiths PTA Student PTA Student        Mobility     Row Name 21 1638          Bed Mobility    Comment (Bed Mobility)  NT [facility]  (Pended)   -MG     Row Name 21 163          Sit-Stand Transfer    Sit-Stand [place] (Transfers)  minimum assist (75% patient effort);contact guard;verbal cues  (Pended)   -MG      Assistive Device (Sit-Stand Transfers)  walker, front-wheeled  (Pended)   -MG     Row Name 03/05/21 1638          Gait/Stairs (Locomotion)    [place] Level (Gait)  contact guard  (Pended)   -MG     Assistive Device (Gait)  walker, front-wheeled  (Pended)   -MG     Distance in Feet (Gait)  35ft, maintained TTWB precautions  (Pended)   -MG     Deviations/Abnormal Patterns (Gait)  antalgic;enoc decreased;gait speed decreased;weight shifting decreased;stride length decreased  (Pended)   -MG     Comment (Gait/Stairs)  pt denied further ambulation due to pain and fatigue.  (Pended)   -MG     Row Name 03/05/21 1638          Mobility    Extremity Weight-bearing Status  left lower extremity  (Pended)   -MG     Left Lower Extremity (Weight-bearing Status)  toe touch weight-bearing (TTWB)  (Pended)   -MG       User Key  (r) = Recorded By, (t) = Taken By, (c) = Cosigned By    Initials Name Provider Type    Komal Quan PTA Student PTA Student        Obj/Interventions     Row Name 03/05/21 1640          Motor Skills    Therapeutic Exercise  --  (Pended)  L LE exercises: QS x10, LAQ x5  -MG       User Key  (r) = Recorded By, (t) = Taken By, (c) = Cosigned By    Initials Name Provider Type    Komal Quan PTA Student PTA Student        Goals/Plan    No documentation.       Clinical Impression     Row Name 03/05/21 1640          Pain Scale: Numbers Pre/Post-Treatment    Pain Location - Side  Left  (Pended)   -MG     Pain Location  hip  (Pended)   -MG     Pain Intervention(s)  Ambulation/increased activity;Repositioned  (Pended)   -MG     Row Name 03/05/21 1640          Plan of Care Review    Plan of Care Reviewed With  patient  (Pended)   -MG     Progress  improving  (Pended)   -MG     Outcome Summary  pt able to increase ambulation to 35ft with FWW and CGA. pt maintained TTWB precautions on left LE but denied further ambulation due to fatigue and pain. pt performed STS transfers with CGA/Barney. pt c/o of  increased fatigue and pain today due to going to get x-rays done. pt performed exercises in the chair with vc'ing for quad firing. continue to progress pt as tolerated.  (Pended)   -MG     Row Name 03/05/21 1640          Positioning and Restraints    Pre-Treatment Position  sitting in chair/recliner  (Pended)   -MG     Post Treatment Position  chair  (Pended)   -MG     In Chair  sitting;encouraged to call for assist;exit alarm on;with family/caregiver  (Pended)   -MG       User Key  (r) = Recorded By, (t) = Taken By, (c) = Cosigned By    Initials Name Provider Type    Komal Quan, PTA Student PTA Student        Outcome Measures     Row Name 03/05/21 1643          How much help from another person do you currently need...    Turning from your back to your side while in flat bed without using bedrails?  3  (Pended)   -MG     Moving from lying on back to sitting on the side of a flat bed without bedrails?  3  (Pended)   -MG     Moving to and from a bed to a chair (including a wheelchair)?  3  (Pended)   -MG     Standing up from a chair using your arms (e.g., wheelchair, bedside chair)?  3  (Pended)   -MG     Climbing 3-5 steps with a railing?  2  (Pended)   -MG     To walk in hospital room?  3  (Pended)   -MG     AM-PAC 6 Clicks Score (PT)  17  (Pended)   -MG       User Key  (r) = Recorded By, (t) = Taken By, (c) = Cosigned By    Initials Name Provider Type    Komal Quan, PTA Student PTA Student        Physical Therapy Education                 Title: PT OT SLP Therapies (Done)     Topic: Physical Therapy (Done)     Point: Mobility training (Done)     Learning Progress Summary           Patient Acceptance, E,D, VU,DU,NR by MG at 3/5/2021 1644    Acceptance, E, VU by SS at 3/4/2021 2116    Acceptance, E,D, VU,DU by MG at 3/4/2021 1613    Acceptance, E,D, VU,NR by  at 3/3/2021 1355    Acceptance, E,D, VU,NR by MS at 3/2/2021 0959                   Point: Home exercise program (Done)     Learning  Progress Summary           Patient Acceptance, E,D, VU,DU,NR by  at 3/5/2021 1644    Acceptance, E, VU by  at 3/4/2021 2116    Acceptance, E,D, VU,DU by  at 3/4/2021 1613    Acceptance, E,D, VU,NR by  at 3/3/2021 1355    Acceptance, E,D, VU,NR by MS at 3/2/2021 0959                   Point: Body mechanics (Done)     Learning Progress Summary           Patient Acceptance, E,D, VU,DU,NR by  at 3/5/2021 1644    Acceptance, E, VU by  at 3/4/2021 2116    Acceptance, E,D, VU,DU by  at 3/4/2021 1613    Acceptance, E,D, VU,NR by  at 3/3/2021 1355    Acceptance, E,D, VU,NR by MS at 3/2/2021 0959                   Point: Precautions (Done)     Learning Progress Summary           Patient Acceptance, E,D, VU,DU,NR by  at 3/5/2021 1644    Acceptance, E, VU by  at 3/4/2021 2116    Acceptance, E,D, VU,DU by  at 3/4/2021 1613    Acceptance, E,D, VU,NR by  at 3/3/2021 1355    Acceptance, E,D, VU,NR by MS at 3/2/2021 0959                               User Key     Initials Effective Dates Name Provider Type Discipline    MS 04/03/18 -  Jude Guillermo PT Physical Therapist PT     08/19/18 -  Mary Jiménez PTA Physical Therapy Assistant PT     01/16/20 -  Sun Young, RN Registered Nurse Nurse     02/26/21 -  Komal Griffiths PTA Student PTA Student PT              PT Recommendation and Plan     Plan of Care Reviewed With: (P) patient  Progress: (P) improving  Outcome Summary: (P) pt able to increase ambulation to 35ft with FWW and CGA. pt maintained TTWB precautions on left LE but denied further ambulation due to fatigue and pain. pt performed STS transfers with CGA/Barney. pt c/o of increased fatigue and pain today due to going to get x-rays done. pt performed exercises in the chair with vc'ing for quad firing. continue to progress pt as tolerated.     Time Calculation:   PT Charges     Row Name 03/05/21 3583             Time Calculation    Start Time  1600  (Pended)   -MG      Stop Time  1612   (Pended)   -MG      Time Calculation (min)  12 min  (Pended)   -MG      PT Received On  03/05/21  (Pended)   -MG      PT - Next Appointment  03/06/21  (Pended)   -MG         Time Calculation- PT    Total Timed Code Minutes- PT  12 minute(s)  (Pended)   -MG        User Key  (r) = Recorded By, (t) = Taken By, (c) = Cosigned By    Initials Name Provider Type    MG Komal Griffiths PTA Student PTA Student        Therapy Charges for Today     Code Description Service Date Service Provider Modifiers Qty    77247123056 HC GAIT TRAINING EA 15 MIN 3/4/2021 Komal Griffiths PTA Student GP 1    10777256250 HC GAIT TRAINING EA 15 MIN 3/5/2021 Komal Griffiths PTA Student GP 1          PT G-Codes  Outcome Measure Options: AM-PAC 6 Clicks Basic Mobility (PT)  AM-PAC 6 Clicks Score (PT): (P) 17    Komal Griffiths PTA Student  3/5/2021

## 2021-03-06 PROCEDURE — 25010000002 ENOXAPARIN PER 10 MG: Performed by: STUDENT IN AN ORGANIZED HEALTH CARE EDUCATION/TRAINING PROGRAM

## 2021-03-06 RX ADMIN — HYDROCODONE BITARTRATE AND ACETAMINOPHEN 1 TABLET: 7.5; 325 TABLET ORAL at 03:20

## 2021-03-06 RX ADMIN — HYDROCODONE BITARTRATE AND ACETAMINOPHEN 1 TABLET: 7.5; 325 TABLET ORAL at 13:09

## 2021-03-06 RX ADMIN — ENOXAPARIN SODIUM 40 MG: 40 INJECTION SUBCUTANEOUS at 19:51

## 2021-03-06 RX ADMIN — SODIUM CHLORIDE, PRESERVATIVE FREE 10 ML: 5 INJECTION INTRAVENOUS at 21:19

## 2021-03-06 RX ADMIN — SODIUM CHLORIDE, PRESERVATIVE FREE 10 ML: 5 INJECTION INTRAVENOUS at 08:57

## 2021-03-06 RX ADMIN — Medication 1 PATCH: at 08:55

## 2021-03-06 RX ADMIN — ENOXAPARIN SODIUM 40 MG: 40 INJECTION SUBCUTANEOUS at 08:57

## 2021-03-06 RX ADMIN — HYDROCODONE BITARTRATE AND ACETAMINOPHEN 1 TABLET: 7.5; 325 TABLET ORAL at 18:15

## 2021-03-06 NOTE — PROGRESS NOTES
Name: Festus Milton ADMIT: 3/1/2021   : 1975  PCP: Provider, No Known    MRN: [id number] LOS: 5 days   AGE/SEX: 45 y.o. male  ROOM: [facility]     Subjective   Subjective    he feels he is able to get around better with less pain    Review of Systems   Constitutional: Negative for fever.   Respiratory: Negative for cough and shortness of breath.    Cardiovascular: Negative for chest pain.   Gastrointestinal: Negative for abdominal pain.      Objective   Objective   Vital Signs  Temp:  [97.4 °F (36.3 °C)-98 °F (36.7 °C)] 98 °F (36.7 °C)  Heart Rate:  [58-71] 58  Resp:  [18] 18  BP: (128-155)/(76-96) 140/76  SpO2:  [97 %-99 %] 98 %  on   ;   Device (Oxygen Therapy): room air  Body mass index is 50.14 kg/m².    Physical Exam  Vitals and nursing note reviewed.   Constitutional:       General: He is not in acute distress.  Cardiovascular:      Rate and Rhythm: Normal rate and regular rhythm.   Pulmonary:      Effort: Pulmonary effort is normal. No respiratory distress.   Abdominal:      Palpations: Abdomen is soft.   Musculoskeletal:         General: No swelling.      Left knee: No swelling or erythema.   Skin:     General: Skin is warm and dry.   Neurological:      Mental Status: He is alert and oriented to person, place, and time.   Psychiatric:         Mood and Affect: Mood normal.         Behavior: Behavior normal.     Results Review  I reviewed the patient's new clinical results.  Results from last 7 days   Lab Units 21  0608 21  1652   WBC 10*3/mm3 9.59 12.12*   HEMOGLOBIN g/dL 14.2 14.8   PLATELETS 10*3/mm3 339 339     Results from last 7 days   Lab Units 21  0608 21  1652   SODIUM mmol/L 135* 136   POTASSIUM mmol/L 4.0 4.3   CHLORIDE mmol/L 101 100   CO2 mmol/L 24.0 26.1   BUN mg/dL 12 14   CREATININE mg/dL 0.74* 0.85   GLUCOSE mg/dL 100* 77     Estimated Creatinine Clearance: 192.8 mL/min (A) (by C-G formula based on SCr of 0.74 mg/dL (L)).    Results from last 7 days   Lab Units  03/01/21  1652   ALBUMIN g/dL 4.10   BILIRUBIN mg/dL 0.4   ALK PHOS U/L 68   AST (SGOT) U/L 24   ALT (SGPT) U/L 33     Results from last 7 days   Lab Units 03/02/21  0608 03/01/21  1652   CALCIUM mg/dL 9.1 9.3   ALBUMIN g/dL  --  4.10       COVID19   Date Value Ref Range Status   03/01/2021 Not Detected Not Detected - Ref. Range Final     No results found for: HGBA1C, POCGLU    Scheduled Meds  enoxaparin, 40 mg, Subcutaneous, Q12H  nicotine, 1 patch, Transdermal, Q24H  sodium chloride, 10 mL, Intravenous, Q12H    Continuous Infusions   PRN Meds  •  acetaminophen **OR** acetaminophen **OR** acetaminophen  •  HYDROcodone-acetaminophen  •  Morphine  •  ondansetron  •  sodium chloride     Diet  Diet Regular; Cardiac     I personally reviewed XR knee     Assessment/Plan     Active Hospital Problems    Diagnosis  POA   • **Closed nondisplaced fracture of posterior wall of left acetabulum (CMS/HCC) [S32.425A]  Yes   • Left knee pain [M25.562]  Unknown   • Lumbar herniated disc [M51.26]  Yes      Resolved Hospital Problems   No resolved problems to display.     45 y.o. male admitted with Closed nondisplaced fracture of posterior wall of left acetabulum (CMS/HCC).    · Left acetabular fracture   · Nonoperative treatment  · Toe-touch weightbearing 6 weeks followed by partial weightbearing for 6 weeks  · PT  · Rehab placement  · L knee XR no fx  · Degenerative stenosis lumbar spine, canal narrowing, left-sided disc bulge  · He will contact neurosurgery office if he begins to develop any worsening low back pain, radicular symptoms  · Lovenox 40 mg SC daily for DVT prophylaxis.  · Full code.  · Discussed with patient and CCP  · Anticipate discharge to SNU facility timing yet to be determined.  Waiting on Worker's Compensation    Mitchel Ruffin MD  [facility]  03/06/21  10:50 EST    I wore protective equipment throughout this patient encounter including a face mask, gloves, and protective eyewear.  Hand  hygiene was performed before donning protective equipment and after removal when leaving the room.

## 2021-03-06 NOTE — PLAN OF CARE
Goal Outcome Evaluation:  Plan of Care Reviewed With: patient  Progress: no change  Outcome Summary: Received pt OOB to chair. Alert and orientedx4. c/o pain to rt hip and PRN pain meds given with good results. Amb to BR with walker and standby assist. Will CTM.

## 2021-03-06 NOTE — PLAN OF CARE
Goal Outcome Evaluation:     Progress: no change  Outcome Summary: Up in chair all day. assisted to bathroom with walker. norco given x1 for pain. awaiting worker's comp cert for rehab placement.

## 2021-03-07 PROCEDURE — 25010000002 ENOXAPARIN PER 10 MG: Performed by: STUDENT IN AN ORGANIZED HEALTH CARE EDUCATION/TRAINING PROGRAM

## 2021-03-07 PROCEDURE — 97110 THERAPEUTIC EXERCISES: CPT

## 2021-03-07 RX ADMIN — HYDROCODONE BITARTRATE AND ACETAMINOPHEN 1 TABLET: 7.5; 325 TABLET ORAL at 05:03

## 2021-03-07 RX ADMIN — ENOXAPARIN SODIUM 40 MG: 40 INJECTION SUBCUTANEOUS at 21:26

## 2021-03-07 RX ADMIN — SODIUM CHLORIDE, PRESERVATIVE FREE 10 ML: 5 INJECTION INTRAVENOUS at 08:13

## 2021-03-07 RX ADMIN — HYDROCODONE BITARTRATE AND ACETAMINOPHEN 1 TABLET: 7.5; 325 TABLET ORAL at 12:44

## 2021-03-07 RX ADMIN — SODIUM CHLORIDE, PRESERVATIVE FREE 10 ML: 5 INJECTION INTRAVENOUS at 22:18

## 2021-03-07 RX ADMIN — Medication 1 PATCH: at 08:10

## 2021-03-07 RX ADMIN — HYDROCODONE BITARTRATE AND ACETAMINOPHEN 1 TABLET: 7.5; 325 TABLET ORAL at 21:26

## 2021-03-07 RX ADMIN — ENOXAPARIN SODIUM 40 MG: 40 INJECTION SUBCUTANEOUS at 08:09

## 2021-03-07 NOTE — PROGRESS NOTES
Name: Festus Milton ADMIT: 3/1/2021   : 1975  PCP: Provider, No Known    MRN: [id number] LOS: 6 days   AGE/SEX: 45 y.o. male  ROOM: [facility]/     Subjective   Subjective    no new complaints.    Review of Systems   Constitutional: Negative for fever.   Respiratory: Negative for cough and shortness of breath.    Cardiovascular: Negative for chest pain.   Gastrointestinal: Negative for abdominal pain.      Objective   Objective   Vital Signs  Temp:  [97.5 °F (36.4 °C)-98.7 °F (37.1 °C)] 97.5 °F (36.4 °C)  Heart Rate:  [62-72] 72  Resp:  [18] 18  BP: (124-168)/(74-95) 136/79  SpO2:  [92 %-100 %] 99 %  on   ;   Device (Oxygen Therapy): room air  Body mass index is 50.14 kg/m².    Physical Exam  Vitals and nursing note reviewed.   Constitutional:       General: He is not in acute distress.  Cardiovascular:      Rate and Rhythm: Normal rate and regular rhythm.   Pulmonary:      Effort: Pulmonary effort is normal. No respiratory distress.   Abdominal:      Palpations: Abdomen is soft.   Musculoskeletal:         General: No swelling.      Left knee: No swelling or erythema.   Skin:     General: Skin is warm and dry.   Neurological:      Mental Status: He is alert and oriented to person, place, and time.   Psychiatric:         Mood and Affect: Mood normal.         Behavior: Behavior normal.     Results Review  I reviewed the patient's new clinical results.  Results from last 7 days   Lab Units 21  0608 21  1652   WBC 10*3/mm3 9.59 12.12*   HEMOGLOBIN g/dL 14.2 14.8   PLATELETS 10*3/mm3 339 339     Results from last 7 days   Lab Units 21  0608 21  1652   SODIUM mmol/L 135* 136   POTASSIUM mmol/L 4.0 4.3   CHLORIDE mmol/L 101 100   CO2 mmol/L 24.0 26.1   BUN mg/dL 12 14   CREATININE mg/dL 0.74* 0.85   GLUCOSE mg/dL 100* 77     Estimated Creatinine Clearance: 192.8 mL/min (A) (by C-G formula based on SCr of 0.74 mg/dL (L)).    Results from last 7 days   Lab Units 21  1652   ALBUMIN g/dL 4.10     BILIRUBIN mg/dL 0.4   ALK PHOS U/L 68   AST (SGOT) U/L 24   ALT (SGPT) U/L 33     Results from last 7 days   Lab Units 03/02/21  0608 03/01/21  1652   CALCIUM mg/dL 9.1 9.3   ALBUMIN g/dL  --  4.10       COVID19   Date Value Ref Range Status   03/01/2021 Not Detected Not Detected - Ref. Range Final     No results found for: HGBA1C, POCGLU    Scheduled Meds  enoxaparin, 40 mg, Subcutaneous, Q12H  nicotine, 1 patch, Transdermal, Q24H  sodium chloride, 10 mL, Intravenous, Q12H    Continuous Infusions   PRN Meds  •  acetaminophen **OR** acetaminophen **OR** acetaminophen  •  HYDROcodone-acetaminophen  •  Morphine  •  ondansetron  •  sodium chloride     Diet  Diet Regular; Cardiac        Assessment/Plan     Active Hospital Problems    Diagnosis  POA   • **Closed nondisplaced fracture of posterior wall of left acetabulum (CMS/HCC) [S32.425A]  Yes   • Left knee pain [M25.562]  Unknown   • Lumbar herniated disc [M51.26]  Yes      Resolved Hospital Problems   No resolved problems to display.     45 y.o. male admitted with Closed nondisplaced fracture of posterior wall of left acetabulum (CMS/HCC).    · Left acetabular fracture  · Nonoperative treatment  · Toe-touch weightbearing 6 weeks followed by partial weightbearing for 6 weeks  · PT  · Rehab placement  · L knee XR no fx  · Degenerative stenosis lumbar spine, canal narrowing, left-sided disc bulge  · He will contact neurosurgery office if he begins to develop any worsening low back pain, radicular symptoms  · Lovenox 40 mg SC daily for DVT prophylaxis.  · Full code.  · Discussed with patient  · Anticipate discharge to SNU facility timing yet to be determined.  Waiting on Worker's Compensation    Mitchel Amari Ruffin MD  [facility]  03/07/21  15:18 EST    I wore protective equipment throughout this patient encounter including a face mask, gloves, and protective eyewear.  Hand hygiene was performed before donning protective equipment and after removal  when leaving the room.

## 2021-03-07 NOTE — THERAPY TREATMENT NOTE
Patient Name: Festus Milton  : 1975    MRN: [id number]                              Today's Date: 3/7/2021       Admit Date: 3/1/2021    Visit Dx:     ICD-10-CM ICD-9-CM   1. Closed nondisplaced fracture of posterior wall of left acetabulum, initial encounter (CMS/[facility])  S32.425A 808.0   2. Closed fracture of left iliac wing, initial encounter (CMS/HCC)  S32.302A 808.41   3. Lumbar herniated disc  M51.26 722.10     Patient Active Problem List   Diagnosis   • Closed nondisplaced fracture of posterior wall of left acetabulum (CMS/[facility])   • Lumbar herniated disc   • Obesity (BMI 30-39.9)   • Left knee pain     Past Medical History:   Diagnosis Date   • Allergic    • Injury of back      No past surgical history on file.  General Information     [facility] Name 21 1514          Physical Therapy Time and Intention    Document Type  therapy note (daily note)  -     Mode of Treatment  physical therapy;individual therapy  -[facility] Name 21 1514          General Information    Existing Precautions/Restrictions  fall;weight bearing  -[facility] Name 21 1514          Cognition    Orientation Status (Cognition)  oriented x 4  -[facility] Name 21 1514          Safety Issues, Functional Mobility    Impairments Affecting Function (Mobility)  endurance/activity tolerance;pain;range of motion (ROM)  -       User Key  (r) = Recorded By, (t) = Taken By, (c) = Cosigned By    Initials Name Provider Type     Emerson Mathias PTA Physical Therapy Assistant        Mobility     Row Name 21 1514          Bed Mobility    Supine-Sit D[place]y (Bed Mobility)  not tested  -     Sit-Supine [place] (Bed Mobility)  not tested  -     Comment (Bed Mobility)  c/o difficutly getting into bed and instructed on and demonstrated use of gait belt for leg  to assist for transition  -     Row Name 21 1514          Bed-Chair Transfer    Bed-Chair D[place]y (Transfers)  standby assist;verbal cues   -RH     Assistive Device (Bed-Chair Transfers)  walker, front-wheeled  -RH     Row Name 03/07/21 1514          Sit-Stand Transfer    Sit-Stand [place] (Transfers)  standby assist;verbal cues  -RH     Assistive Device (Sit-Stand Transfers)  walker, front-wheeled  -RH     Row Name 03/07/21 1514          Gait/Stairs (Locomotion)    [place] Level (Gait)  standby assist;verbal cues  -RH     Assistive Device (Gait)  walker, front-wheeled  -RH     Distance in Feet (Gait)  40'x1 with turns  -RH     Deviations/Abnormal Patterns (Gait)  antalgic;enoc decreased  -RH     Bilateral Gait Deviations  forward flexed posture  -RH     Row Name 03/07/21 1514          Mobility    Extremity Weight-bearing Status  left lower extremity  -RH     Left Lower Extremity (Weight-bearing Status)  toe touch weight-bearing (TTWB)  -RH       User Key  (r) = Recorded By, (t) = Taken By, (c) = Cosigned By    Initials Name Provider Type     Emerson Mathias, FAM Physical Therapy Assistant        Obj/Interventions     Row Name 03/07/21 1516          Balance    Balance Assessment  sitting static balance;standing static balance  -RH     Static Sitting Balance  WFL;sitting in chair  -RH     Static Standing Balance  WFL;supported;standing attains TTWB LLE  -RH     Comment, Balance  focused on keeping mass over JACIEL for stability and attaining WBS with BUE support required to improve stability and standing tolerance  -RH       User Key  (r) = Recorded By, (t) = Taken By, (c) = Cosigned By    Initials Name Provider Type     Emerson Mathias, FAM Physical Therapy Assistant        Goals/Plan    No documentation.       Clinical Impression     [facility] Name 03/07/21 1518          Pain Scale: Numbers Pre/Post-Treatment    Pretreatment Pain Rating  6/10  -RH     Posttreatment Pain Rating  6/10  -RH     Pain Location - Side  Left  -RH     Pain Location  hip  -RH     Row Name 03/07/21 1518          Vital Signs    O2 Delivery Intra Treatment  room air   -RH     Row Name 03/07/21 1518          Positioning and Restraints    Pre-Treatment Position  sitting in chair/recliner  -RH     Post Treatment Position  chair  -RH     In Chair  sitting;call light within reach  -RH       User Key  (r) = Recorded By, (t) = Taken By, (c) = Cosigned By    Initials Name Provider Type     Emerson Mathias PTA Physical Therapy Assistant        Outcome Measures     Row Name 03/07/21 1518          How much help from another person do you currently need...    Turning from your back to your side while in flat bed without using bedrails?  3  -RH     Moving from lying on back to sitting on the side of a flat bed without bedrails?  3  -RH     Moving to and from a bed to a chair (including a wheelchair)?  3  -RH     Standing up from a chair using your arms (e.g., wheelchair, bedside chair)?  3  -RH     Climbing 3-5 steps with a railing?  1  -RH     To walk in hospital room?  3  -RH     AM-PAC 6 Clicks Score (PT)  16  -RH       User Key  (r) = Recorded By, (t) = Taken By, (c) = Cosigned By    Initials Name Provider Type    Emerson Frederick PTA Physical Therapy Assistant        Physical Therapy Education                 Title: PT OT SLP Therapies (Done)     Topic: Physical Therapy (Done)     Point: Mobility training (Done)     Learning Progress Summary           Patient Acceptance, E,D, VU,DU,NR by MG at 3/5/2021 1644    Acceptance, E, VU by SS at 3/4/2021 2116    Acceptance, E,D, VU,DU by MG at 3/4/2021 1613    Acceptance, E,D, VU,NR by  at 3/3/2021 1355    Acceptance, E,D, VU,NR by MS at 3/2/2021 0959                   Point: Home exercise program (Done)     Learning Progress Summary           Patient Acceptance, E,D, VU,DU,NR by MG at 3/5/2021 1644    Acceptance, E, VU by SS at 3/4/2021 2116    Acceptance, E,D, VU,DU by MG at 3/4/2021 1613    Acceptance, E,D, VU,NR by  at 3/3/2021 1355    Acceptance, E,D, VU,NR by MS at 3/2/2021 0959                   Point: Body mechanics  (Done)     Learning Progress Summary           Patient Acceptance, E,D, VU,DU,NR by  at 3/5/2021 1644    Acceptance, E, VU by  at 3/4/2021 2116    Acceptance, E,D, VU,DU by  at 3/4/2021 1613    Acceptance, E,D, VU,NR by  at 3/3/2021 1355    Acceptance, E,D, VU,NR by MS at 3/2/2021 0959                   Point: Precautions (Done)     Learning Progress Summary           Patient Acceptance, E,D, VU,DU,NR by  at 3/5/2021 1644    Acceptance, E, VU by  at 3/4/2021 2116    Acceptance, E,D, VU,DU by  at 3/4/2021 1613    Acceptance, E,D, VU,NR by  at 3/3/2021 1355    Acceptance, E,D, VU,NR by MS at 3/2/2021 0959                               User Key     Initials Effective Dates Name Provider Type Discipline    MS 04/03/18 -  Jude Guillermo, PT Physical Therapist PT     08/19/18 -  Mary Jiménez PTA Physical Therapy Assistant PT     01/16/20 -  Sun Young, RN Registered Nurse Nurse     02/26/21 -  Komal Griffiths PTA Student PTA Student PT              PT Recommendation and Plan           Time Calculation:   PT Charges     Row Name 03/07/21 1519             Time Calculation    Start Time  1254  -RH      Stop Time  1332  -RH      Time Calculation (min)  38 min  -      PT Received On  03/07/21  -      PT - Next Appointment  03/08/21  -        User Key  (r) = Recorded By, (t) = Taken By, (c) = Cosigned By    Initials Name Provider Type     Emerson Mathias PTA Physical Therapy Assistant        Therapy Charges for Today     Code Description Service Date Service Provider Modifiers Qty    [id number] HC PT THER PROC EA 15 MIN 3/7/2021 Emerson Mathias PTA GP 3          PT G-Codes  Outcome Measure Options: AM-PAC 6 Clicks Basic Mobility (PT)  AM-PAC 6 Clicks Score (PT): 16    Emerson Mathias PTA  3/7/2021

## 2021-03-07 NOTE — PROGRESS NOTES
Discharge Planning Assessment  [facility]     Patient Name: Festus Milton  MRN: [id number]  Today's Date: 3/7/2021    Admit Date: 3/1/2021    Discharge Needs Assessment    No documentation.       Discharge Plan     Row Name 03/07/21 0803       Plan    Plan  [facility] waiting on bed and Workers Compensation to approve.    Plan Comments  Call placed to [facility]  [phone]  and informed CCP they can not accept the patient today. Dinah MEYERS        Continued Care and Services - Admitted Since 3/1/2021     Destination     Service Provider Request Status Selected Services Address Phone Fax Patient Preferred    [facility]  Accepted N/A [address] [address] [phone] --    [facility]  Accepted N/A [address] [address] [phone] --    [facility]  Pending - Request Sent N/A [address] [facility] [id number] [phone] --    [facility]  Declined  Patient Insurance Not Accepted N/A [address] [address] [phone] --       Internal Comment last updated by Shannan Menard CSW 3/2/2021 0949    Spoke with [facility]  Declined  Patient Insurance Not Accepted N/A [address] [id number] [phone] --       Internal Comment last updated by Shannan Menard CSW 3/2/2021 0950    LVM for Bethany                            Demographic Summary    No documentation.       Functional Status    No documentation.       Psychosocial    No documentation.       Abuse/Neglect    No documentation.       Legal    No documentation.       Substance Abuse    No documentation.       Patient Forms    No documentation.           Cecilia Noriega, RN

## 2021-03-07 NOTE — PLAN OF CARE
Goal Outcome Evaluation:  Plan of Care Reviewed With: patient  Progress: no change  Outcome Summary: No events overnight. OOB to chair. c/o pain to left hip and Norco given per prn. NAD. VSS. Will CTM.

## 2021-03-08 LAB — SARS-COV-2 RNA RESP QL NAA+PROBE: NOT DETECTED

## 2021-03-08 PROCEDURE — U0003 INFECTIOUS AGENT DETECTION BY NUCLEIC ACID (DNA OR RNA); SEVERE ACUTE RESPIRATORY SYNDROME CORONAVIRUS 2 (SARS-COV-2) (CORONAVIRUS DISEASE [COVID-19]), AMPLIFIED PROBE TECHNIQUE, MAKING USE OF HIGH THROUGHPUT TECHNOLOGIES AS DESCRIBED BY CMS-2020-01-R: HCPCS | Performed by: HOSPITALIST

## 2021-03-08 PROCEDURE — 97116 GAIT TRAINING THERAPY: CPT

## 2021-03-08 PROCEDURE — 25010000002 ENOXAPARIN PER 10 MG: Performed by: STUDENT IN AN ORGANIZED HEALTH CARE EDUCATION/TRAINING PROGRAM

## 2021-03-08 RX ADMIN — SODIUM CHLORIDE, PRESERVATIVE FREE 10 ML: 5 INJECTION INTRAVENOUS at 20:47

## 2021-03-08 RX ADMIN — HYDROCODONE BITARTRATE AND ACETAMINOPHEN 1 TABLET: 7.5; 325 TABLET ORAL at 07:27

## 2021-03-08 RX ADMIN — ENOXAPARIN SODIUM 40 MG: 40 INJECTION SUBCUTANEOUS at 09:31

## 2021-03-08 RX ADMIN — Medication 1 PATCH: at 10:10

## 2021-03-08 RX ADMIN — SODIUM CHLORIDE, PRESERVATIVE FREE 10 ML: 5 INJECTION INTRAVENOUS at 09:31

## 2021-03-08 RX ADMIN — ENOXAPARIN SODIUM 40 MG: 40 INJECTION SUBCUTANEOUS at 20:47

## 2021-03-08 NOTE — PLAN OF CARE
Goal Outcome Evaluation:  Plan of Care Reviewed With: patient  Progress: no change  Outcome Summary: No events overnight. Norco at bedtime. Patient sleeps in chair. Awaiting discharge planning.

## 2021-03-08 NOTE — PROGRESS NOTES
Continued Stay Note  [facility]     Patient Name: Festus Milton  MRN: [id number]  Today's Date: 3/8/2021    Admit Date: 3/1/2021    Discharge Plan     Row Name 03/08/21 1620       Plan    Plan  [facility] pending financial (worker's compensation) and bed availability. CCP to arrange wheelchair transportation. Packet in office.    Patient/Family in Agreement with Plan  yes    Plan Comments  Spoke with Franc. States [employer] insurance says patient's company to cover worker's compensation benefits. Awaiting letter from employer. Call placed to Viky/Elaina to inform. Provided her information for [facility]. Received call from Franc stating letter obtained and sent to director for review. Says able to accept pending financial review and bed availability. CCP will need to assist with arranging w/c transportation.Call placed to Pat/A CCP to obtain order for Covid test. Continue to follow.        Discharge Codes    No documentation.             Anh Jarquin RN

## 2021-03-08 NOTE — PLAN OF CARE
Goal Outcome Evaluation:  Plan of Care Reviewed With: patient  Progress: improving  Outcome Summary: Pt AOx4, complaining of pain in left hip well controlled with oral pain med, IV saline locked, tolerating food no nausea. Pt up in the chair most of the day worked with physical therapy. Currently awaiting rehab placement. VSS no acute needs

## 2021-03-08 NOTE — PROGRESS NOTES
Name: Fesuts Milton ADMIT: 3/1/2021   : 1975  PCP: Provider, No Known    MRN: [id number] LOS: 7 days   AGE/SEX: 45 y.o. male  ROOM: [facility]/     Subjective   Subjective    no new complaints.    Review of Systems   Constitutional: Negative for fever.   Respiratory: Negative for cough and shortness of breath.    Cardiovascular: Negative for chest pain.   Gastrointestinal: Negative for abdominal pain.      Objective   Objective   Vital Signs  Temp:  [96.7 °F (35.9 °C)-98 °F (36.7 °C)] 96.7 °F (35.9 °C)  Heart Rate:  [65-72] 66  Resp:  [16-18] 16  BP: (115-147)/(78-89) 117/78  SpO2:  [97 %-100 %] 99 %  on   ;   Device (Oxygen Therapy): room air  Body mass index is 50.14 kg/m².    Physical Exam  Vitals and nursing note reviewed.   Constitutional:       General: He is not in acute distress.  Cardiovascular:      Rate and Rhythm: Normal rate and regular rhythm.   Pulmonary:      Effort: Pulmonary effort is normal. No respiratory distress.   Abdominal:      Palpations: Abdomen is soft.   Musculoskeletal:         General: No swelling.      Left knee: No swelling or erythema.   Skin:     General: Skin is warm and dry.   Neurological:      Mental Status: He is alert and oriented to person, place, and time.   Psychiatric:         Mood and Affect: Mood normal.         Behavior: Behavior normal.     Results Review  I reviewed the patient's new clinical results.  Results from last 7 days   Lab Units 21  0608 21  1652   WBC 10*3/mm3 9.59 12.12*   HEMOGLOBIN g/dL 14.2 14.8   PLATELETS 10*3/mm3 339 339     Results from last 7 days   Lab Units 21  0608 21  1652   SODIUM mmol/L 135* 136   POTASSIUM mmol/L 4.0 4.3   CHLORIDE mmol/L 101 100   CO2 mmol/L 24.0 26.1   BUN mg/dL 12 14   CREATININE mg/dL 0.74* 0.85   GLUCOSE mg/dL 100* 77     Estimated Creatinine Clearance: 192.8 mL/min (A) (by C-G formula based on SCr of 0.74 mg/dL (L)).    Results from last 7 days   Lab Units 21  1652   ALBUMIN g/dL 4.10    BILIRUBIN mg/dL 0.4   ALK PHOS U/L 68   AST (SGOT) U/L 24   ALT (SGPT) U/L 33     Results from last 7 days   Lab Units 03/02/21  0608 03/01/21  1652   CALCIUM mg/dL 9.1 9.3   ALBUMIN g/dL  --  4.10       COVID19   Date Value Ref Range Status   03/01/2021 Not Detected Not Detected - Ref. Range Final     No results found for: HGBA1C, POCGLU    Scheduled Meds  enoxaparin, 40 mg, Subcutaneous, Q12H  nicotine, 1 patch, Transdermal, Q24H  sodium chloride, 10 mL, Intravenous, Q12H    Continuous Infusions   PRN Meds  •  acetaminophen **OR** acetaminophen **OR** acetaminophen  •  HYDROcodone-acetaminophen  •  Morphine  •  ondansetron  •  sodium chloride     Diet  Diet Regular; Cardiac        Assessment/Plan     Active Hospital Problems    Diagnosis  POA   • **Closed nondisplaced fracture of posterior wall of left acetabulum (CMS/HCC) [S32.425A]  Yes   • Left knee pain [M25.562]  Unknown   • Lumbar herniated disc [M51.26]  Yes      Resolved Hospital Problems   No resolved problems to display.     45 y.o. male admitted with Closed nondisplaced fracture of posterior wall of left acetabulum (CMS/HCC).    · Left acetabular fracture  · Nonoperative treatment  · Toe-touch weightbearing 6 weeks followed by partial weightbearing for 6 weeks  · PT  · Rehab placement  · L knee XR no fx  · Degenerative stenosis lumbar spine, canal narrowing, left-sided disc bulge  · He will contact neurosurgery office if he begins to develop any worsening low back pain, radicular symptoms  · Lovenox 40 mg SC daily for DVT prophylaxis.  · Full code.  · Discussed with patient  · Anticipate discharge to SNU facility timing yet to be determined.  Waiting on Worker's Compensation    Mitchel Amari Ruffin MD  [facility]  03/08/21  08:23 EST    I wore protective equipment throughout this patient encounter including a face mask, gloves, and protective eyewear.  Hand hygiene was performed before donning protective equipment and after removal  when leaving the room.

## 2021-03-08 NOTE — THERAPY TREATMENT NOTE
Patient Name: Festus Milton  : 1975    MRN: [id number]                              Today's Date: 3/8/2021       Admit Date: 3/1/2021    Visit Dx:     ICD-10-CM ICD-9-CM   1. Closed nondisplaced fracture of posterior wall of left acetabulum, initial encounter (CMS/[facility])  S32.425A 808.0   2. Closed fracture of left iliac wing, initial encounter (CMS/[facility])  S32.302A 808.41   3. Lumbar herniated disc  M51.26 722.10     Patient Active Problem List   Diagnosis   • Closed nondisplaced fracture of posterior wall of left acetabulum (CMS/[facility])   • Lumbar herniated disc   • Obesity (BMI 30-39.9)   • Left knee pain     Past Medical History:   Diagnosis Date   • Allergic    • Injury of back      No past surgical history on file.  General Information     Row Name 21 1548          Physical Therapy Time and Intention    Document Type  therapy note (daily note)  (Pended)   -     Mode of Treatment  physical therapy;individual therapy  (Pended)   -MG     Row Name 21 154          General Information    Existing Precautions/Restrictions  fall;weight bearing  (Pended)   -MG     Row Name 21 154          Cognition    Orientation Status (Cognition)  oriented x 4  (Pended)   -MG     Row Name 21 1548          Safety Issues, Functional Mobility    Impairments Affecting Function (Mobility)  endurance/activity tolerance;pain;range of motion (ROM);strength  (Pended)   -MG     Comment, Safety Issues/Impairments (Mobility)  gait belt and non skid socks for safety  (Pended)   -MG       User Key  (r) = Recorded By, (t) = Taken By, (c) = Cosigned By    Initials Name Provider Type    MG Komal Griffiths PTA Student PTA Student        Mobility     Row Name 21 1548          Bed Mobility    Comment (Bed Mobility)  NT UIC  (Pended)   -MG     Row Name 21 1548          Sit-Stand Transfer    Sit-Stand [place] (Transfers)  standby assist;verbal cues  (Pended)   -     Assistive Device (Sit-Stand Transfers)   walker, front-wheeled  (Pended)   -MG     Row Name 03/08/21 1548          Gait/Stairs (Locomotion)    Assistive Device (Gait)  walker, front-wheeled  (Pended)   -MG     Distance in Feet (Gait)  25ft, declined further ambulation due to fatigue  (Pended)   -MG     Deviations/Abnormal Patterns (Gait)  antalgic;enoc decreased  (Pended)   -MG     Bilateral Gait Deviations  forward flexed posture  (Pended)   -MG     Comment (Gait/Stairs)  pt maintained TTWB LLE  (Pended)   -MG     Row Name 03/08/21 1548          Mobility    Extremity Weight-bearing Status  left lower extremity  (Pended)   -MG     Left Lower Extremity (Weight-bearing Status)  toe touch weight-bearing (TTWB)  (Pended)   -MG       User Key  (r) = Recorded By, (t) = Taken By, (c) = Cosigned By    Initials Name Provider Type    Komal Quan, PTA Student PTA Student        Obj/Interventions     Row Name 03/08/21 1549          Motor Skills    Therapeutic Exercise  --  (Pended)  L LE exercises: LAQ, QS, heel slides (x5); reviewed HEP and told to increase HEP 2x/day  -MG       User Key  (r) = Recorded By, (t) = Taken By, (c) = Cosigned By    Initials Name Provider Type    Komal Quan, PTA Student PTA Student        Goals/Plan    No documentation.       Clinical Impression     Row Name 03/08/21 4355          Pain Scale: Numbers Pre/Post-Treatment    Pain Location - Side  Left  (Pended)   -MG     Pain Location  hip  (Pended)   -MG     Pain Intervention(s)  Ambulation/increased activity;Repositioned  (Pended)   -MG     Row Name 03/08/21 9039          Plan of Care Review    Plan of Care Reviewed With  patient  (Pended)   -MG     Progress  improving  (Pended)   -MG     Outcome Summary  pt was able to ambulate 25ft with CGA and FWW. pt declined further ambulation due to fatigue and pain. pt maintained TTWB precautions. pt completed STS transfers with CGA and FWW. pt completed L LE exercises in chair. Reviewed HEP with pt and educated on increaing HEP  frequency. contionue to progress pt as tolerated. pt would benefit from skilled PT to address pain, mobility and balance deficits.  (Pended)   -MG     Row Name 03/08/21 1550          Positioning and Restraints    Pre-Treatment Position  sitting in chair/recliner  (Pended)   -MG     Post Treatment Position  chair  (Pended)   -MG     In Chair  sitting;call light within reach;encouraged to call for assist  (Pended)   -MG       User Key  (r) = Recorded By, (t) = Taken By, (c) = Cosigned By    Initials Name Provider Type    Komal Quan, PTA Student PTA Student        Outcome Measures     Row Name 03/08/21 1554          How much help from another person do you currently need...    Turning from your back to your side while in flat bed without using bedrails?  3  (Pended)   -MG     Moving from lying on back to sitting on the side of a flat bed without bedrails?  3  (Pended)   -MG     Moving to and from a bed to a chair (including a wheelchair)?  3  (Pended)   -MG     Standing up from a chair using your arms (e.g., wheelchair, bedside chair)?  3  (Pended)   -MG     Climbing 3-5 steps with a railing?  1  (Pended)   -MG     To walk in hospital room?  3  (Pended)   -MG     AM-PAC 6 Clicks Score (PT)  16  (Pended)   -MG       User Key  (r) = Recorded By, (t) = Taken By, (c) = Cosigned By    Initials Name Provider Type    Komal Quan, PTA Student PTA Student        Physical Therapy Education                 Title: PT OT SLP Therapies (Done)     Topic: Physical Therapy (Done)     Point: Mobility training (Done)     Learning Progress Summary           Patient Acceptance, E,D, VU,DU by MG at 3/8/2021 1555    Acceptance, E,D, VU,DU,NR by MG at 3/5/2021 1644    Acceptance, E, VU by SS at 3/4/2021 2116    Acceptance, E,D, VU,DU by MG at 3/4/2021 1613    Acceptance, E,D, VU,NR by  at 3/3/2021 1355    Acceptance, E,D, VU,NR by MS at 3/2/2021 0959                   Point: Home exercise program (Done)     Learning  Progress Summary           Patient Acceptance, E,D, VU,DU by MG at 3/8/2021 1555    Acceptance, E,D, VU,DU,NR by  at 3/5/2021 1644    Acceptance, E, VU by  at 3/4/2021 2116    Acceptance, E,D, VU,DU by  at 3/4/2021 1613    Acceptance, E,D, VU,NR by  at 3/3/2021 1355    Acceptance, E,D, VU,NR by MS at 3/2/2021 0959                   Point: Body mechanics (Done)     Learning Progress Summary           Patient Acceptance, E,D, VU,DU by  at 3/8/2021 1555    Acceptance, E,D, VU,DU,NR by  at 3/5/2021 1644    Acceptance, E, VU by  at 3/4/2021 2116    Acceptance, E,D, VU,DU by  at 3/4/2021 1613    Acceptance, E,D, VU,NR by  at 3/3/2021 1355    Acceptance, E,D, VU,NR by MS at 3/2/2021 0959                   Point: Precautions (Done)     Learning Progress Summary           Patient Acceptance, E,D, VU,DU by  at 3/8/2021 1555    Acceptance, E,D, VU,DU,NR by  at 3/5/2021 1644    Acceptance, E, VU by  at 3/4/2021 2116    Acceptance, E,D, VU,DU by  at 3/4/2021 1613    Acceptance, E,D, VU,NR by  at 3/3/2021 1355    Acceptance, E,D, VU,NR by MS at 3/2/2021 0959                               User Key     Initials Effective Dates Name Provider Type Discipline    MS 04/03/18 -  Jude Guillermo, PT Physical Therapist PT     08/19/18 -  Mary Jiménez, PTA Physical Therapy Assistant PT     01/16/20 -  Sun Young, RN Registered Nurse Nurse     02/26/21 -  Komal Grfifiths PTA Student PTA Student PT              PT Recommendation and Plan     Plan of Care Reviewed With: (P) patient  Progress: (P) improving  Outcome Summary: (P) pt was able to ambulate 25ft with CGA and FWW. pt declined further ambulation due to fatigue and pain. pt maintained TTWB precautions. pt completed STS transfers with CGA and FWW. pt completed L LE exercises in chair. Reviewed HEP with pt and educated on increaing HEP frequency. contionue to progress pt as tolerated. pt would benefit from skilled PT to address pain, mobility and  balance deficits.     Time Calculation:   PT Charges     Row Name 03/08/21 1555             Time Calculation    Start Time  1457  (Pended)   -MG      Stop Time  1510  (Pended)   -MG      Time Calculation (min)  13 min  (Pended)   -MG      PT Received On  03/08/21  (Pended)   -MG      PT - Next Appointment  03/10/21  (Pended)   -MG         Time Calculation- PT    Total Timed Code Minutes- PT  13 minute(s)  (Pended)   -MG        User Key  (r) = Recorded By, (t) = Taken By, (c) = Cosigned By    Initials Name Provider Type    MG Komal Griffiths PTA Student PTA Student        Therapy Charges for Today     Code Description Service Date Service Provider Modifiers Qty    34156443604 HC GAIT TRAINING EA 15 MIN 3/8/2021 Komal Griffiths PTA Student GP 1          PT G-Codes  Outcome Measure Options: AM-PAC 6 Clicks Basic Mobility (PT)  AM-PAC 6 Clicks Score (PT): (P) 16    Komal Griffiths PTA Student  3/8/2021

## 2021-03-08 NOTE — PLAN OF CARE
Goal Outcome Evaluation:  Plan of Care Reviewed With: (P) patient  Progress: (P) improving  Outcome Summary: (P) pt was able to ambulate 25ft with CGA and FWW. pt declined further ambulation due to fatigue and pain. pt maintained TTWB precautions. pt completed STS transfers with CGA and FWW. pt completed L LE exercises in chair. Reviewed HEP with pt and educated on increaing HEP frequency. contionue to progress pt as tolerated. pt would benefit from skilled PT to address pain, mobility and balance deficits.  Pt wore mask. Therapist wore proper PPE and standard mask. Pt was not enhanced droplet precaution.

## 2021-03-09 VITALS
TEMPERATURE: 97.7 F | OXYGEN SATURATION: 98 % | BODY MASS INDEX: 45.1 KG/M2 | HEART RATE: 64 BPM | WEIGHT: 315 LBS | RESPIRATION RATE: 16 BRPM | SYSTOLIC BLOOD PRESSURE: 128 MMHG | DIASTOLIC BLOOD PRESSURE: 82 MMHG | HEIGHT: 70 IN

## 2021-03-09 PROCEDURE — 25010000002 ENOXAPARIN PER 10 MG: Performed by: STUDENT IN AN ORGANIZED HEALTH CARE EDUCATION/TRAINING PROGRAM

## 2021-03-09 RX ORDER — HYDROCODONE BITARTRATE AND ACETAMINOPHEN 7.5; 325 MG/1; MG/1
1 TABLET ORAL EVERY 4 HOURS PRN
Qty: 10 TABLET | Refills: 0 | Status: SHIPPED | OUTPATIENT
Start: 2021-03-09

## 2021-03-09 RX ORDER — ACETAMINOPHEN 325 MG/1
325 TABLET ORAL EVERY 4 HOURS PRN
Start: 2021-03-09

## 2021-03-09 RX ADMIN — HYDROCODONE BITARTRATE AND ACETAMINOPHEN 1 TABLET: 7.5; 325 TABLET ORAL at 02:48

## 2021-03-09 RX ADMIN — Medication 1 PATCH: at 12:38

## 2021-03-09 RX ADMIN — HYDROCODONE BITARTRATE AND ACETAMINOPHEN 1 TABLET: 7.5; 325 TABLET ORAL at 14:54

## 2021-03-09 RX ADMIN — ENOXAPARIN SODIUM 40 MG: 40 INJECTION SUBCUTANEOUS at 09:28

## 2021-03-09 RX ADMIN — SODIUM CHLORIDE, PRESERVATIVE FREE 10 ML: 5 INJECTION INTRAVENOUS at 09:29

## 2021-03-09 NOTE — DISCHARGE SUMMARY
[facility]    Date of Discharge:  3/9/2021    PCP: Provider, No Known    Discharge Diagnosis:   Active Hospital Problems    Diagnosis  POA   • **Closed nondisplaced fracture of posterior wall of left acetabulum (CMS/HCC) [S32.425A]  Yes   • Left knee pain [M25.562]  Unknown   • Lumbar herniated disc [M51.26]  Yes      Resolved Hospital Problems   No resolved problems to display.      Consults     Date and Time Order Name Status Description    3/2/2021  8:41 AM Inpatient Orthopedic Surgery Consult Completed     3/1/2021  8:40 PM Inpatient Neurosurgery Consult Completed     3/1/2021  7:34 PM Neurosurgery (on-call MD unless specified) Completed     3/1/2021  7:34 PM LHA (on-call MD unless specified) Details Completed     3/1/2021  7:13 PM Ortho (on-call MD unless specified) Completed         Hospital Course  Please see history and physical for details. Patient is a 45 y.o. male 6 with a history of obesity and gout presents after mechanical fall at work.    He had a left acetabular fracture.  Orthopedic surgery recommended nonoperative treatment.  He will be toe-touch weightbearing 6 weeks followed by partial weightbearing for another 6 weeks.  Given his obesity orthopedic surgery recommended rehab placement.  PT to mobilize.    He also had left-sided disc bulge in addition to degenerative stenosis lumbar spine and some canal narrowing.  Neurosurgery saw him and they said that if he developed any worsening low back pain or radicular symptoms he can call their office for an appointment.    Pain is adequately controlled and he is going to be discharged to skilled nursing facility today.    I discussed the patient's findings and my recommendations with patient and CCP.    Condition on Discharge: Improved.     Temp:  [97.1 °F (36.2 °C)-98.6 °F (37 °C)] 97.7 °F (36.5 °C)  Heart Rate:  [64-69] 64  Resp:  [16-18] 16  BP: (110-128)/(69-82) 128/82  Body mass index is 50.14  kg/m².    Physical Exam  Vitals and nursing note reviewed.   Constitutional:       General: He is not in acute distress.  Cardiovascular:      Rate and Rhythm: Normal rate and regular rhythm.   Pulmonary:      Effort: Pulmonary effort is normal. No respiratory distress.      Breath sounds: Normal breath sounds.   Abdominal:      General: Bowel sounds are normal.      Palpations: Abdomen is soft.      Tenderness: There is no abdominal tenderness. There is no guarding or rebound.   Musculoskeletal:         General: No swelling.   Skin:     General: Skin is warm and dry.   Neurological:      General: No focal deficit present.      Mental Status: He is alert and oriented to person, place, and time.   Psychiatric:         Mood and Affect: Mood normal.         Behavior: Behavior normal.     While in the room and during my examination of the patient I wore gloves, mask, eye protection.  I washed my hands before and after this patient encounter.     Disposition: Skilled Nursing Facility (DC - External)       Discharge Medications      New Medications      Instructions Start Date   acetaminophen 325 MG tablet  Commonly known as: TYLENOL   325 mg, Oral, Every 4 Hours PRN      HYDROcodone-acetaminophen 7.5-325 MG per tablet  Commonly known as: NORCO   1 tablet, Oral, Every 4 Hours PRN            Diet Instructions     Diet: Regular      Discharge Diet: Regular         Activity Instructions     Activity as Tolerated      Toe-touch weightbearing for 6 weeks followed by partial weightbearing for 6 weeks.  Physical therapy for mobilization.         Additional Instructions for the Follow-ups that You Need to Schedule     Call MD for problems / concerns.   As directed         Contact information for follow-up providers     Claude Huerta MD Follow up.    Specialty: Neurosurgery  Contact information:  [address]  [place] [id number] [phone]             Roscoe Loyola II, MD Follow up.    Specialty:  Orthopedic Surgery  Contact information:  [address]  [address]  [place] [id number] [phone]             Provider, No Known .    Contact information:  [place] [id number] [phone]                   Contact information for after-discharge care     Destination     [facility] .    Service: Skilled Nursing  Contact information:  [address] [address]  [phone]                               Mitchel Ruffin MD  03/09/21  14:32 EST    Discharge time spent greater than 30 minutes.

## 2021-03-09 NOTE — PLAN OF CARE
Goal Outcome Evaluation:  Plan of Care Reviewed With: patient  Progress: no change  Outcome Summary: gave norco x1 thus far, using walker to get around, has been approved for [facility] re[facility], should be d/c today

## 2021-03-09 NOTE — PROGRESS NOTES
Continued Stay Note  [facility]     Patient Name: Festusmala Milton  MRN: [id number]  Today's Date: 3/9/2021    Admit Date: 3/1/2021    Discharge Plan     Row Name 03/09/21 1326       Plan    Plan  [facility].    Plan Comments  Received call from Khadar Cruz, they will have a bed available and can accept today at [facility], [facility] does not have any bed availability.  Patient is agreeable to [facility].  Spoke with Viky CHRISTINA, she will coordinate payment of transportation via [employer]. Arranged [employer] transport for 4PM today, bariatric wheelchair, cost $105.00 trip ID [id number].        Discharge Codes    No documentation.       Expected Discharge Date and Time     Expected Discharge Date Expected Discharge Time    Mar 9, 2021             Jennifer Combs RN

## 2021-03-09 NOTE — PROGRESS NOTES
Case Management Discharge Note      Final Note: [facility] -         Selected Continued Care - Admitted Since 3/1/2021     Destination Coordination complete    Service Provider Selected Services Address Phone Fax Patient Preferred    [facility]  Skilled Nursing [address] [id number] [phone] --          Durable Medical Equipment    No services have been selected for the patient.              Dialysis/Infusion    No services have been selected for the patient.              Home Medical Care    No services have been selected for the patient.              Therapy    No services have been selected for the patient.              Community Resources    No services have been selected for the patient.                  Transportation Services  W/C Van: [facility] (Employer will pay transport cost.)    Final Discharge Disposition Code: 03 - skilled nursing facility (SNF)

## 2021-03-18 ENCOUNTER — TRANSCRIBE ORDERS (OUTPATIENT)
Dept: PHYSICAL THERAPY | Facility: CLINIC | Age: 46
End: 2021-03-18

## 2021-03-18 DIAGNOSIS — S32.9XXA CLOSED NONDISPLACED FRACTURE OF PELVIS, UNSPECIFIED PART OF PELVIS, INITIAL ENCOUNTER (HCC): Primary | ICD-10-CM

## 2021-03-23 ENCOUNTER — TREATMENT (OUTPATIENT)
Dept: PHYSICAL THERAPY | Facility: CLINIC | Age: 46
End: 2021-03-23

## 2021-03-23 DIAGNOSIS — S32.9XXD CLOSED NONDISPLACED FRACTURE OF PELVIS WITH ROUTINE HEALING, UNSPECIFIED PART OF PELVIS, SUBSEQUENT ENCOUNTER: Primary | ICD-10-CM

## 2021-03-23 PROCEDURE — 97110 THERAPEUTIC EXERCISES: CPT | Performed by: PHYSICAL THERAPIST

## 2021-03-23 PROCEDURE — 97161 PT EVAL LOW COMPLEX 20 MIN: CPT | Performed by: PHYSICAL THERAPIST

## 2021-03-23 NOTE — PROGRESS NOTES
Physical Therapy Initial Evaluation and Plan of Care        Subjective Evaluation    History of Present Illness  Date of onset: 3/1/2021  Mechanism of injury: Patient reports that his left foot got caught at work and he fell to the ground, landing on the left hip.  Patient was in the hospital for about 8 days, then in rehab for 8-9 days.  Reports having x-rays yesterday and that the fracture is healing.      Patient Occupation: Construction   Precautions and Work Restrictions: currently offPain  Current pain ratin  At worst pain ratin  Location: lateral lateral hip/glut region  Quality: throbbing  Aggravating factors: movement, standing and ambulation             Objective          Observations     Additional Hip Observation Details  Patient ambulates into the clinic with the use of a rolling walker, toe touch weight bearing until 21.    Palpation     Additional Palpation Details  No TTP present    Active Range of Motion   Left Hip   Flexion: Left hip active flexion: about 45. with pain  Abduction: WFL  Adduction: WFL    Strength/Myotome Testing     Left Hip   Planes of Motion   Flexion: 4-  Abduction: 4+  Adduction: 4+    Left Knee   Extension: 4    Left Ankle/Foot   Dorsiflexion: 5    Tests     Additional Tests Details  N/T at this time.          Assessment & Plan     Assessment  Impairments: abnormal gait, abnormal or restricted ROM, activity intolerance, impaired balance, impaired physical strength, lacks appropriate home exercise program and pain with function  Assessment details: Patient presents with c/o pain, limited AROM, decreased strength and an antalgic gait pattern with the walker which is limiting his ability to perform full job duties and ADL'S.  Barriers to therapy: none  Prognosis: good  Prognosis details: STG's   1)  Independent with HEP  2)  Decrease pain by 50% or more  3)  AROM left hip flexion to 70    LTG's   1)  Independent with HEP progression  2)  Decrease pain by 75% or  more  3)  Increase strength for the left hip to 4+/5  4)  AROM left hip flexion to 90  5)  Patient to ambulate with the use of a cane PWB on the left LE      Plan  Therapy options: will be seen for skilled physical therapy services  Planned therapy interventions: strengthening, stretching, therapeutic activities, home exercise program and gait training  Treatment plan discussed with: patient        Manual Therapy:    0     mins  31707;  Therapeutic Exercise:    20     mins  32194;     Neuromuscular Dianelys:    0    mins  19795;    Therapeutic Activity:     0     mins  95314;     Gait Trainin     mins  67882;     Ultrasound:     0     mins  78317;    Work Hardening           0      mins 94000  Iontophoresis               0   mins 89172    Timed Treatment:   20   mins   Total Treatment:     32   mins    PT SIGNATURE: Flynn Marquez, PT   DATE TREATMENT INITIATED: 3/23/2021    Initial Certification  Certification Period: 2021  I certify that the therapy services are furnished while this patient is under my care.  The services outlined above are required by this patient, and will be reviewed every 90 days.     PHYSICIAN: Chuy Paredes MD      DATE:     Please sign and return via fax to [phone].. Thank you, [facility].

## 2021-03-24 ENCOUNTER — TREATMENT (OUTPATIENT)
Dept: PHYSICAL THERAPY | Facility: CLINIC | Age: 46
End: 2021-03-24

## 2021-03-24 DIAGNOSIS — S32.9XXD CLOSED NONDISPLACED FRACTURE OF PELVIS WITH ROUTINE HEALING, UNSPECIFIED PART OF PELVIS, SUBSEQUENT ENCOUNTER: Primary | ICD-10-CM

## 2021-03-24 PROCEDURE — 97110 THERAPEUTIC EXERCISES: CPT | Performed by: PHYSICAL THERAPIST

## 2021-03-24 NOTE — PROGRESS NOTES
Physical Therapy Daily Progress Note             Subjective  Patient reports soreness in the hip region, but states that his gout may be acting up.    Objective   See Exercise, Manual, and Modality Logs for complete treatment.       Assessment/Plan  Patient tolerated the progression of strengthening exercises without c/o pain in the right hip region, but was sore with the increase.  Plan to gradually progress the strengthening exercises on the table.                   Manual Therapy:    0     mins  14510;  Therapeutic Exercise:    23     mins  10644;    Neuromuscular Dianelys:    0    mins  32342;    Therapeutic Activity:     0     mins  60899;     Gait Trainin     mins  37897;     Ultrasound:     0     mins  18815;    Work Hardening           0      mins 88336  Iontophoresis               0   mins 43989    Timed Treatment:   23   mins   Total Treatment:     23   mins    Flynn Marquez, PT  Physical Therapist

## 2021-03-26 ENCOUNTER — TREATMENT (OUTPATIENT)
Dept: PHYSICAL THERAPY | Facility: CLINIC | Age: 46
End: 2021-03-26

## 2021-03-26 DIAGNOSIS — S32.9XXD CLOSED NONDISPLACED FRACTURE OF PELVIS WITH ROUTINE HEALING, UNSPECIFIED PART OF PELVIS, SUBSEQUENT ENCOUNTER: Primary | ICD-10-CM

## 2021-03-26 PROCEDURE — 97110 THERAPEUTIC EXERCISES: CPT | Performed by: PHYSICAL THERAPIST

## 2021-03-26 NOTE — PROGRESS NOTES
Physical Therapy Daily Progress Note      Visit # 3      Subjective Evaluation    History of Present Illness    Subjective comment: Pt reports that his current pain level is 2/10 today.       Objective   See Exercise, Manual, and Modality Logs for complete treatment.       Assessment & Plan     Assessment  Assessment details: Pt tolerated treatment with no increased pain in (L) hip.  Pt was limited to a very small ROM when attempting (L)  s/l hip abd, and quickly fatigued.  Pt had no issues with the progression of reps on his other exercises.                     Manual Therapy:    0     mins  12541;  Therapeutic Exercise:    30     mins  78728;     Neuromuscular Dianelys:    0    mins  84940;    Therapeutic Activity:     0     mins  37117;     Gait Trainin     mins  83042;     Ultrasound:     0     mins  28846;    Work Hardening           0      mins 45205  Iontophoresis               0   mins 80961  E-Stim                          _0_ mins 33236 ( )    Timed Treatment:   30   mins   Total Treatment:     30   mins    Blane Moura PTA  Physical Therapist Assistant

## 2021-03-29 ENCOUNTER — TREATMENT (OUTPATIENT)
Dept: PHYSICAL THERAPY | Facility: CLINIC | Age: 46
End: 2021-03-29

## 2021-03-29 DIAGNOSIS — S32.9XXD CLOSED NONDISPLACED FRACTURE OF PELVIS WITH ROUTINE HEALING, UNSPECIFIED PART OF PELVIS, SUBSEQUENT ENCOUNTER: Primary | ICD-10-CM

## 2021-03-29 PROCEDURE — 97110 THERAPEUTIC EXERCISES: CPT | Performed by: PHYSICAL THERAPIST

## 2021-03-29 NOTE — PROGRESS NOTES
"Physical Therapy Daily Progress Note      Visit # 4      Subjective Evaluation    History of Present Illness    Subjective comment: Pt reports current pain level of \"maybe 1\".       Objective   See Exercise, Manual, and Modality Logs for complete treatment.       Assessment & Plan     Assessment  Assessment details: Pt tolerated treatment with no c/o's of increased pain.  He is making progress in strengthening his (L) LE for a return to ADL's.  He stated that he felt like he was able to get a greater ROM in clamshells today then he did last week.                       Manual Therapy:    0     mins  60491;  Therapeutic Exercise:    26     mins  78342;     Neuromuscular Dianelys:    0    mins  07304;    Therapeutic Activity:     0     mins  52056;     Gait Trainin     mins  73404;     Ultrasound:     0     mins  12329;    Work Hardening           0      mins 15228  Iontophoresis               0   mins 00955  E-Stim                          _0_ mins 13350 ( )    Timed Treatment:   26   mins   Total Treatment:     26   mins    Blane Moura PTA  Physical Therapist Assistant  "

## 2021-03-31 ENCOUNTER — TREATMENT (OUTPATIENT)
Dept: PHYSICAL THERAPY | Facility: CLINIC | Age: 46
End: 2021-03-31

## 2021-03-31 DIAGNOSIS — S32.9XXD CLOSED NONDISPLACED FRACTURE OF PELVIS WITH ROUTINE HEALING, UNSPECIFIED PART OF PELVIS, SUBSEQUENT ENCOUNTER: Primary | ICD-10-CM

## 2021-03-31 PROCEDURE — 97110 THERAPEUTIC EXERCISES: CPT | Performed by: PHYSICAL THERAPIST

## 2021-03-31 NOTE — PROGRESS NOTES
Physical Therapy Daily Progress Note      Visit # 5      Subjective Evaluation    History of Present Illness    Subjective comment: Pt reports that (L) hip is feeling pretty good.       Objective   See Exercise, Manual, and Modality Logs for complete treatment.   Added seated knee flex    Assessment & Plan     Assessment  Assessment details: Pt completed treatment with no c/o pain in (L) hip.  Pt did c/o soreness/stiffness in (L) knee. Discussed importance of continuing to move his knee through a complete ROM.  Continue to progress as tolerated per wt bearing restrictions.                     Manual Therapy:    0     mins  81794;  Therapeutic Exercise:    30     mins  60218;     Neuromuscular Dianelys:  0     mins  72056;    Therapeutic Activity:     0     mins  28552;     Gait Trainin     mins  94688;     Ultrasound:     0     mins  45913;    Work Hardening           0      mins 24350  Iontophoresis               0   mins 32547  E-Stim                          _0_ mins 21865 ( )    Timed Treatment:   30   mins   Total Treatment:     30   mins    Blane Moura PTA  Physical Therapist Assistant

## 2021-04-02 ENCOUNTER — TREATMENT (OUTPATIENT)
Dept: PHYSICAL THERAPY | Facility: CLINIC | Age: 46
End: 2021-04-02

## 2021-04-02 DIAGNOSIS — S32.9XXD CLOSED NONDISPLACED FRACTURE OF PELVIS WITH ROUTINE HEALING, UNSPECIFIED PART OF PELVIS, SUBSEQUENT ENCOUNTER: Primary | ICD-10-CM

## 2021-04-02 PROCEDURE — 97110 THERAPEUTIC EXERCISES: CPT | Performed by: PHYSICAL THERAPIST

## 2021-04-02 NOTE — PROGRESS NOTES
"Physical Therapy Daily Progress Note      Visit # 6      Subjective Evaluation    History of Present Illness    Subjective comment: Pt reports that his hip has \"felt great for last two days.\"       Objective   See Exercise, Manual, and Modality Logs for complete treatment.       Assessment & Plan     Assessment  Assessment details: Pt tolerated treatment with no substantial increase in pain.  He c/o discomfort in (L) hip after performing s/l clams and hip abd, but was able to complete them.                       Manual Therapy:    0     mins  43384;  Therapeutic Exercise:    34     mins  96165;     Neuromuscular Dianelys:    0    mins  60970;    Therapeutic Activity:     0     mins  75451;     Gait Trainin     mins  01108;     Ultrasound:     0     mins  75401;    Work Hardening           0      mins 59858  Iontophoresis               0   mins 88053  E-Stim                          _0_ mins 48670 ( )    Timed Treatment:   34   mins   Total Treatment:     34   mins    Blane Moura PTA  Physical Therapist Assistant  "

## 2021-04-05 ENCOUNTER — TREATMENT (OUTPATIENT)
Dept: PHYSICAL THERAPY | Facility: CLINIC | Age: 46
End: 2021-04-05

## 2021-04-05 DIAGNOSIS — S32.9XXD CLOSED NONDISPLACED FRACTURE OF PELVIS WITH ROUTINE HEALING, UNSPECIFIED PART OF PELVIS, SUBSEQUENT ENCOUNTER: Primary | ICD-10-CM

## 2021-04-05 PROCEDURE — 97110 THERAPEUTIC EXERCISES: CPT | Performed by: PHYSICAL THERAPIST

## 2021-04-05 NOTE — PROGRESS NOTES
Physical Therapy Daily Progress Note      Visit # 7      Subjective Evaluation    History of Present Illness    Subjective comment: Pt reports (L) knee better, but still a little sore.       Objective   See Exercise, Manual, and Modality Logs for complete treatment.   Added SLR    Assessment & Plan     Assessment  Assessment details: Pt tolerated treatment with minimal c/o pain.  Pt had mild c/o pain in (L) hip from SLR, but no other c/o's.  Plan to progress per weight bearing restrictions.                     Manual Therapy:    0     mins  74151;  Therapeutic Exercise:    32     mins  08450;     Neuromuscular Dianelys:    0    mins  24125;    Therapeutic Activity:     0     mins  06367;     Gait Trainin     mins  58014;     Ultrasound:     0     mins  36472;    Work Hardening           0      mins 94754  Iontophoresis               0   mins 46395  E-Stim                          _0_ mins 27963 ( )    Timed Treatment:   32   mins   Total Treatment:     32   mins    Blane Moura PTA  Physical Therapist Assistant

## 2021-04-07 ENCOUNTER — TREATMENT (OUTPATIENT)
Dept: PHYSICAL THERAPY | Facility: CLINIC | Age: 46
End: 2021-04-07

## 2021-04-07 DIAGNOSIS — S32.9XXD CLOSED NONDISPLACED FRACTURE OF PELVIS WITH ROUTINE HEALING, UNSPECIFIED PART OF PELVIS, SUBSEQUENT ENCOUNTER: Primary | ICD-10-CM

## 2021-04-07 PROCEDURE — 97110 THERAPEUTIC EXERCISES: CPT | Performed by: PHYSICAL THERAPIST

## 2021-04-07 NOTE — PROGRESS NOTES
"Physical Therapy Daily Progress Note      Visit # 8      Subjective Evaluation    History of Present Illness    Subjective comment: Pt reports hip is \"feeling pretty good.\"Pain  No pain reported           Objective   See Exercise, Manual, and Modality Logs for complete treatment.       Assessment & Plan     Assessment  Assessment details: Pt tolerated treatment with no c/o pain in (L) hip.  Pt continues to make progress in strengthening his (L) LE for a return to ADL's and full work duties.  Plan to progress as tolerated.                     Manual Therapy:    0     mins  35647;  Therapeutic Exercise:    30     mins  79395;     Neuromuscular Dianelys:    0    mins  33476;    Therapeutic Activity:     0     mins  75695;     Gait Trainin     mins  40958;     Ultrasound:     0     mins  34110;    Work Hardening           0      mins 92590  Iontophoresis               0   mins 76482  E-Stim                          _0_ mins 08559 ( )    Timed Treatment:   30   mins   Total Treatment:     30   mins    Blane Moura PTA  Physical Therapist Assistant  "

## 2021-04-09 ENCOUNTER — TREATMENT (OUTPATIENT)
Dept: PHYSICAL THERAPY | Facility: CLINIC | Age: 46
End: 2021-04-09

## 2021-04-09 DIAGNOSIS — S32.425D CLOSED NONDISPLACED FRACTURE OF POSTERIOR WALL OF LEFT ACETABULUM WITH ROUTINE HEALING, SUBSEQUENT ENCOUNTER: ICD-10-CM

## 2021-04-09 DIAGNOSIS — S32.9XXD CLOSED NONDISPLACED FRACTURE OF PELVIS WITH ROUTINE HEALING, UNSPECIFIED PART OF PELVIS, SUBSEQUENT ENCOUNTER: Primary | ICD-10-CM

## 2021-04-09 PROCEDURE — 97110 THERAPEUTIC EXERCISES: CPT | Performed by: PHYSICAL THERAPIST

## 2021-04-09 NOTE — PROGRESS NOTES
Physical Therapy Daily Progress Note      Visit # 9      Subjective Evaluation    History of Present Illness    Subjective comment: Pt reports that his (L) hip feels good today.Pain  Current pain ratin           Objective   See Exercise, Manual, and Modality Logs for complete treatment.       Assessment & Plan     Assessment  Assessment details: Pt tolerated treatment with no c/o pain in (L) hip.  He had no issues with progressions in reps.  Pt benefits from vc to initiate the exercises.  Plan to progress to closed chain exercises as tolerated.                      Manual Therapy:   00     mins  37699;  Therapeutic Exercise:    43     mins  64746;     Neuromuscular Dianelys:    0    mins  57104;    Therapeutic Activity:     0     mins  02008;     Gait Trainin     mins  31362;     Ultrasound:     0     mins  02167;    Work Hardening           0      mins 80940  Iontophoresis               0   mins 53485  E-Stim                          _0_ mins 16476 ( )    Timed Treatment:   43   mins   Total Treatment:     43   mins    Blane Moura PTA  Physical Therapist Assistant

## 2021-04-12 ENCOUNTER — TREATMENT (OUTPATIENT)
Dept: PHYSICAL THERAPY | Facility: CLINIC | Age: 46
End: 2021-04-12

## 2021-04-12 DIAGNOSIS — S32.9XXD CLOSED NONDISPLACED FRACTURE OF PELVIS WITH ROUTINE HEALING, UNSPECIFIED PART OF PELVIS, SUBSEQUENT ENCOUNTER: Primary | ICD-10-CM

## 2021-04-12 DIAGNOSIS — S32.425D CLOSED NONDISPLACED FRACTURE OF POSTERIOR WALL OF LEFT ACETABULUM WITH ROUTINE HEALING, SUBSEQUENT ENCOUNTER: ICD-10-CM

## 2021-04-12 PROCEDURE — 97110 THERAPEUTIC EXERCISES: CPT | Performed by: PHYSICAL THERAPIST

## 2021-04-12 PROCEDURE — 97530 THERAPEUTIC ACTIVITIES: CPT | Performed by: PHYSICAL THERAPIST

## 2021-04-12 NOTE — PROGRESS NOTES
"Physical Therapy Daily Progress Note      Visit # 10      Subjective Evaluation    History of Present Illness    Subjective comment: Pt reports that his hip is \"sore\", but denies pain.       Objective   See Exercise, Manual, and Modality Logs for complete treatment.   Added sit to stand, Standing heel raise, standing weight shift, standing hip Abd    Assessment & Plan     Assessment  Assessment details: Pt tolerated the addition of standing exercises today without substantial c/o pain.  He did c/o\"discomfort\" in (L) hip from standing weight shifts as he fatigued.  He completed the rest of his exercises without complaints.                     Manual Therapy:    0     mins  07417;  Therapeutic Exercise:    26     mins  73192;     Neuromuscular Dianelys:    0    mins  44704;    Therapeutic Activity:     9     mins  37754;     Gait Trainin     mins  95361;     Ultrasound:     0     mins  00721;    Work Hardening           0      mins 75484  Iontophoresis               0   mins 99434  E-Stim                          _0_ mins 80193 ( )    Timed Treatment:   35   mins   Total Treatment:     35   mins    Blane Moura PTA  Physical Therapist Assistant  "

## 2021-04-14 ENCOUNTER — TREATMENT (OUTPATIENT)
Dept: PHYSICAL THERAPY | Facility: CLINIC | Age: 46
End: 2021-04-14

## 2021-04-14 DIAGNOSIS — S32.425D CLOSED NONDISPLACED FRACTURE OF POSTERIOR WALL OF LEFT ACETABULUM WITH ROUTINE HEALING, SUBSEQUENT ENCOUNTER: ICD-10-CM

## 2021-04-14 DIAGNOSIS — S32.9XXD CLOSED NONDISPLACED FRACTURE OF PELVIS WITH ROUTINE HEALING, UNSPECIFIED PART OF PELVIS, SUBSEQUENT ENCOUNTER: Primary | ICD-10-CM

## 2021-04-14 PROCEDURE — 97110 THERAPEUTIC EXERCISES: CPT | Performed by: PHYSICAL THERAPIST

## 2021-04-14 PROCEDURE — 97530 THERAPEUTIC ACTIVITIES: CPT | Performed by: PHYSICAL THERAPIST

## 2021-04-14 NOTE — PROGRESS NOTES
"Physical Therapy Daily Progress Note      Visit # 11      Subjective Evaluation    History of Present Illness    Subjective comment: Pt reports that other than soreness in (L) hip in the morning he has no pain.       Objective   See Exercise, Manual, and Modality Logs for complete treatment.   Adeed Hip bridge, step ups    Assessment & Plan     Assessment  Assessment details: Pt tolerated treatment with no c/o pain in (L) hip. Pt did c/o \"tightness \" in (L) hip when performing standing exercises. Pt had no issues with the addition of hip bridges and step up, but did display deficits in strength.   Plan to progress as tolerated to increase tolerance and strength for ADL's/ RTW.                      Manual Therapy:    0     mins  41333;  Therapeutic Exercise:    28     mins  33027;     Neuromuscular Dianelys:    0    mins  10515;    Therapeutic Activity:     12     mins  54499;     Gait Trainin     mins  92781;     Ultrasound:     0     mins  19231;    Work Hardening           0      mins 65505  Iontophoresis               0   mins 89815  E-Stim                          _0_ mins 18469 ( )    Timed Treatment:   40   mins   Total Treatment:     40   mins    Blane Moura PTA  Physical Therapist Assistant  "

## 2021-04-16 ENCOUNTER — TREATMENT (OUTPATIENT)
Dept: PHYSICAL THERAPY | Facility: CLINIC | Age: 46
End: 2021-04-16

## 2021-04-16 DIAGNOSIS — S32.425D CLOSED NONDISPLACED FRACTURE OF POSTERIOR WALL OF LEFT ACETABULUM WITH ROUTINE HEALING, SUBSEQUENT ENCOUNTER: ICD-10-CM

## 2021-04-16 DIAGNOSIS — S32.9XXD CLOSED NONDISPLACED FRACTURE OF PELVIS WITH ROUTINE HEALING, UNSPECIFIED PART OF PELVIS, SUBSEQUENT ENCOUNTER: Primary | ICD-10-CM

## 2021-04-16 PROCEDURE — 97530 THERAPEUTIC ACTIVITIES: CPT | Performed by: PHYSICAL THERAPIST

## 2021-04-16 PROCEDURE — 97110 THERAPEUTIC EXERCISES: CPT | Performed by: PHYSICAL THERAPIST

## 2021-04-16 NOTE — PROGRESS NOTES
Physical Therapy Daily Progress Note      Visit # 12      Subjective Evaluation    History of Present Illness    Subjective comment: Pt reports that he has had no increase in hip pain or soreness from increased standing exercises.       Objective   See Exercise, Manual, and Modality Logs for complete treatment.   Added squats    Assessment & Plan     Assessment  Assessment details: Pt tolerated treatment with no provocation of pain/discomfort in (L) hip.  Pt benefited from vc to limit ROM to 1/4  for now when performing squats.   An to progress as tolerated.                     Manual Therapy:    0     mins  03377;  Therapeutic Exercise:    26     mins  16278;     Neuromuscular Dianelys:    0    mins  17339;    Therapeutic Activity:     13     mins  58229;     Gait Trainin     mins  51985;     Ultrasound:     0     mins  97437;    Work Hardening           0      mins 93110  Iontophoresis               0   mins 27105  E-Stim                          _0_ mins 78980 ( )    Timed Treatment:   39   mins   Total Treatment:     39   mins    Blane Moura PTA  Physical Therapist Assistant

## 2021-04-20 ENCOUNTER — TREATMENT (OUTPATIENT)
Dept: PHYSICAL THERAPY | Facility: CLINIC | Age: 46
End: 2021-04-20

## 2021-04-20 DIAGNOSIS — S32.9XXD CLOSED NONDISPLACED FRACTURE OF PELVIS WITH ROUTINE HEALING, UNSPECIFIED PART OF PELVIS, SUBSEQUENT ENCOUNTER: Primary | ICD-10-CM

## 2021-04-20 PROCEDURE — 97110 THERAPEUTIC EXERCISES: CPT | Performed by: PHYSICAL THERAPIST

## 2021-04-20 PROCEDURE — 97530 THERAPEUTIC ACTIVITIES: CPT | Performed by: PHYSICAL THERAPIST

## 2021-04-20 NOTE — PROGRESS NOTES
Re-Assessment / Re-Certification    Patient: Festus Milton   : 1975  Diagnosis/ICD-10 Code:  Closed nondisplaced fracture of pelvis with routine healing, unspecified part of pelvis, subsequent encounter [S32.9XXD]  Referring practitioner: Chuy Paredes MD  Today's Date: 2021  Patient seen for 13 sessions      Subjective:   Festus Milton reports: pain rated at less than a 1/10.  Reports deep pain in the left anterior hip when he does has pain.    Treatment has included: therapeutic exercise and therapeutic activity    Subjective   Objective          Observations     Additional Hip Observation Details  Patient ambulates with an antalgic gait pattern with the cane on the left side.  (Patient was instructed to use it on his right side).    Active Range of Motion   Left Hip   Flexion: Left hip active flexion: WNL.   Abduction: Active abduction with hip flexed: WNL.   Adduction: WFL    Strength/Myotome Testing     Left Hip   Planes of Motion   Flexion: 4+  Abduction: 5  Adduction: 5    Left Knee   Extension: 4+    Left Ankle/Foot   Dorsiflexion: 5      Assessment & Plan     Assessment  Assessment details: Patient has done well with PT thus far and has made good progress.  Deficits persist with regard to gait and function.  Prognosis details: STG's to be met by 2 weeks  1)  Independent with HEP  2)  Decrease pain by 50% or more  3)  Good gait pattern with the cane on the right side     LTG's   1)  Independent with HEP progression  2)  Decrease pain by 75% or more  3)  Increase strength for the left LE to 5/5  4)  Patient to ambulate without the cane with a min to no antalgic gait      Plan  Therapy options: will be seen for skilled physical therapy services  Planned therapy interventions: strengthening, stretching, therapeutic activities, gait training and home exercise program  Treatment plan discussed with: patient        PT Signature: Flynn Marquez, PT        Manual Therapy:    0     mins  42823;  Therapeutic  Exercise:    26     mins  31816;     Neuromuscular Dianelys:    0    mins  74757;    Therapeutic Activity:     8     mins  94109;     Gait Trainin     mins  33961;     Ultrasound:     0     mins  19442;    Work Hardening           0      mins 68716  Iontophoresis               0   mins 57047    Timed Treatment:   34   mins   Total Treatment:     34   mins

## 2021-04-22 ENCOUNTER — TREATMENT (OUTPATIENT)
Dept: PHYSICAL THERAPY | Facility: CLINIC | Age: 46
End: 2021-04-22

## 2021-04-22 DIAGNOSIS — S32.9XXD CLOSED NONDISPLACED FRACTURE OF PELVIS WITH ROUTINE HEALING, UNSPECIFIED PART OF PELVIS, SUBSEQUENT ENCOUNTER: Primary | ICD-10-CM

## 2021-04-22 PROCEDURE — 97530 THERAPEUTIC ACTIVITIES: CPT | Performed by: PHYSICAL THERAPIST

## 2021-04-22 PROCEDURE — 97110 THERAPEUTIC EXERCISES: CPT | Performed by: PHYSICAL THERAPIST

## 2021-04-22 NOTE — PROGRESS NOTES
Physical Therapy Daily Progress Note           Subjective  Patient reports no pain in the hip.    Objective   See Exercise, Manual, and Modality Logs for complete treatment.       Assessment/Plan  Patient ambulates with a good gait pattern with the crutch on the right side.  Patient did very well with the progression of strengthening and proprioception activities.  Good balance with SLS and heel to toe walk and no reports of pain.  Plan to increase the height of the step for the step activities.                   Manual Therapy:    0     mins  97296;  Therapeutic Exercise:    28     mins  00002;     Neuromuscular Dianelys:    0    mins  53376;    Therapeutic Activity:     10     mins  72895;     Gait Trainin     mins  04981;     Ultrasound:     0     mins  35213;    Work Hardening           0      mins 24361  Iontophoresis               0   mins 82076    Timed Treatment:   38   mins   Total Treatment:     38   mins    Flynn Marquez, PT  Physical Therapist

## 2021-04-26 ENCOUNTER — TREATMENT (OUTPATIENT)
Dept: PHYSICAL THERAPY | Facility: CLINIC | Age: 46
End: 2021-04-26

## 2021-04-26 DIAGNOSIS — S32.9XXD CLOSED NONDISPLACED FRACTURE OF PELVIS WITH ROUTINE HEALING, UNSPECIFIED PART OF PELVIS, SUBSEQUENT ENCOUNTER: Primary | ICD-10-CM

## 2021-04-26 PROCEDURE — 97110 THERAPEUTIC EXERCISES: CPT | Performed by: PHYSICAL THERAPIST

## 2021-04-26 PROCEDURE — 97530 THERAPEUTIC ACTIVITIES: CPT | Performed by: PHYSICAL THERAPIST

## 2021-04-26 NOTE — PROGRESS NOTES
Physical Therapy Daily Progress Note             Subjective  Patient reports no pain in the hip.  Reports soreness yesterday secondary to being on his feet a lot the day before.    Objective   See Exercise, Manual, and Modality Logs for complete treatment.       Assessment/Plan  Subjective reports continue to be good with regard to pain.  Patient reported some pain with SLR and clams, but had no reports of pain otherwise.  Good tolerance to the progression of closed chain activities.                   Manual Therapy:    0     mins  92992;  Therapeutic Exercise:    23     mins  88698;     Neuromuscular Dianelys:    0    mins  25520;    Therapeutic Activity:     23     mins  38671;     Gait Trainin     mins  95581;     Ultrasound:     0     mins  58246;    Work Hardening           0      mins 67043  Iontophoresis               0   mins 77692    Timed Treatment:   46   mins   Total Treatment:     46   mins    Flynn Marquez, PT  Physical Therapist

## 2021-04-29 ENCOUNTER — TREATMENT (OUTPATIENT)
Dept: PHYSICAL THERAPY | Facility: CLINIC | Age: 46
End: 2021-04-29

## 2021-04-29 DIAGNOSIS — S32.9XXD CLOSED NONDISPLACED FRACTURE OF PELVIS WITH ROUTINE HEALING, UNSPECIFIED PART OF PELVIS, SUBSEQUENT ENCOUNTER: Primary | ICD-10-CM

## 2021-04-29 PROCEDURE — 97110 THERAPEUTIC EXERCISES: CPT | Performed by: PHYSICAL THERAPIST

## 2021-04-29 PROCEDURE — 97530 THERAPEUTIC ACTIVITIES: CPT | Performed by: PHYSICAL THERAPIST

## 2021-04-29 NOTE — PROGRESS NOTES
Physical Therapy Daily Progress Note           Subjective  Patient reports that the hip is alright, denies pain.    Objective   See Exercise, Manual, and Modality Logs for complete treatment.       Assessment/Plan  Subjective reports continue to be good.  Patient did very well with the increase in closed chain activity.  Good proprioception on the left LE.  Plan to progress the weight bearing activities as he can tolerate.                   Manual Therapy:    0     mins  93481;  Therapeutic Exercise:    10     mins  91851;     Neuromuscular Dianelys:    0    mins  07474;    Therapeutic Activity:     26     mins  85348;     Gait Trainin     mins  15331;     Ultrasound:     0     mins  21267;    Work Hardening           0      mins 41966  Iontophoresis               0   mins 61582    Timed Treatment:   36   mins   Total Treatment:     36   mins    Flynn Marquez, PT  Physical Therapist

## 2021-05-05 ENCOUNTER — TREATMENT (OUTPATIENT)
Dept: PHYSICAL THERAPY | Facility: CLINIC | Age: 46
End: 2021-05-05

## 2021-05-05 DIAGNOSIS — S32.9XXD CLOSED NONDISPLACED FRACTURE OF PELVIS WITH ROUTINE HEALING, UNSPECIFIED PART OF PELVIS, SUBSEQUENT ENCOUNTER: Primary | ICD-10-CM

## 2021-05-05 PROCEDURE — 97110 THERAPEUTIC EXERCISES: CPT | Performed by: PHYSICAL THERAPIST

## 2021-05-05 PROCEDURE — 97530 THERAPEUTIC ACTIVITIES: CPT | Performed by: PHYSICAL THERAPIST

## 2021-05-05 NOTE — PROGRESS NOTES
Physical Therapy Daily Progress Note            Subjective  Patient reports that the hip is feeling pretty good, denies pain.    Objective          Observations     Additional Hip Observation Details  Patient ambulates with a minimal antalgic gait without the use of the cane.      See Exercise, Manual, and Modality Logs for complete treatment.       Assessment/Plan  Subjective reports continue to be good.  Patient came in without the cane today, but a minimal antalgic gait pattern.  Patient performed the exercise routine without c/o pain, but did have some soreness in the hip.                   Manual Therapy:    0     mins  30645;  Therapeutic Exercise:    12     mins  58151;     Neuromuscular Dianelys:    0    mins  65255;    Therapeutic Activity:     26     mins  97370;     Gait Trainin     mins  40711;     Ultrasound:     0     mins  26500;    Work Hardening           0      mins 83597  Iontophoresis               0   mins 65171    Timed Treatment:   38   mins   Total Treatment:     38   mins    Flynn Marquez, PT  Physical Therapist

## 2021-05-07 ENCOUNTER — TREATMENT (OUTPATIENT)
Dept: PHYSICAL THERAPY | Facility: CLINIC | Age: 46
End: 2021-05-07

## 2021-05-07 DIAGNOSIS — S32.9XXD CLOSED NONDISPLACED FRACTURE OF PELVIS WITH ROUTINE HEALING, UNSPECIFIED PART OF PELVIS, SUBSEQUENT ENCOUNTER: Primary | ICD-10-CM

## 2021-05-07 DIAGNOSIS — S32.425D CLOSED NONDISPLACED FRACTURE OF POSTERIOR WALL OF LEFT ACETABULUM WITH ROUTINE HEALING, SUBSEQUENT ENCOUNTER: ICD-10-CM

## 2021-05-07 PROCEDURE — 97530 THERAPEUTIC ACTIVITIES: CPT | Performed by: PHYSICAL THERAPIST

## 2021-05-07 PROCEDURE — 97110 THERAPEUTIC EXERCISES: CPT | Performed by: PHYSICAL THERAPIST

## 2021-05-07 NOTE — PROGRESS NOTES
Physical Therapy Daily Progress Note      Visit # 18      Subjective Evaluation    History of Present Illness    Subjective comment: Pt reports that he has trouble bringing his (L) foot up to tie his shoe laces.       Objective   See Exercise, Manual, and Modality Logs for complete treatment.       Assessment & Plan     Assessment  Assessment details: Pt tolerated treatment with no c/o pain in (L) hip.  Pt did become fatigued toward end of treatment and had c/o pain in (L) knee during sit to stands.  He was able to complete his sit to stands with several seated rest breaks.                     Manual Therapy:    0     mins  26679;  Therapeutic Exercise:    16     mins  41939;     Neuromuscular Dianelys:    0    mins  06347;    Therapeutic Activity:     29     mins  78154;     Gait Trainin     mins  41727;     Ultrasound:     0     mins  37428;    Work Hardening           0      mins 97807  Iontophoresis               0   mins 87823  E-Stim                          _0_ mins 68738 ( )    Timed Treatment:   45   mins   Total Treatment:     45   mins    Blane Moura PTA  Physical Therapist Assistant

## 2021-05-10 ENCOUNTER — TREATMENT (OUTPATIENT)
Dept: PHYSICAL THERAPY | Facility: CLINIC | Age: 46
End: 2021-05-10

## 2021-05-10 DIAGNOSIS — S32.425D CLOSED NONDISPLACED FRACTURE OF POSTERIOR WALL OF LEFT ACETABULUM WITH ROUTINE HEALING, SUBSEQUENT ENCOUNTER: ICD-10-CM

## 2021-05-10 DIAGNOSIS — S32.9XXD CLOSED NONDISPLACED FRACTURE OF PELVIS WITH ROUTINE HEALING, UNSPECIFIED PART OF PELVIS, SUBSEQUENT ENCOUNTER: Primary | ICD-10-CM

## 2021-05-10 PROCEDURE — 97110 THERAPEUTIC EXERCISES: CPT | Performed by: PHYSICAL THERAPIST

## 2021-05-10 PROCEDURE — 97530 THERAPEUTIC ACTIVITIES: CPT | Performed by: PHYSICAL THERAPIST

## 2021-05-10 NOTE — PROGRESS NOTES
"Physical Therapy Daily Progress Note      Visit # 19      Subjective Evaluation    History of Present Illness    Subjective comment: Pt reports that he doesn't have any \"pain\" in his (L) hip,\" Just  sore like always\".       Objective   See Exercise, Manual, and Modality Logs for complete treatment.   Added mod piriformis    Assessment & Plan     Assessment  Assessment details: Pt tolerated treatment with no c/o pain in (L) hip.  Pt became increasingly fatigued as treatment progressed and needed several standing rest breaks to complete his prescribed exercises.                     Manual Therapy:    0     mins  79778;  Therapeutic Exercise:    17     mins  26075;     Neuromuscular Dianelys:    0    mins  03623;    Therapeutic Activity:     31     mins  12247;     Gait Trainin     mins  59574;     Ultrasound:     0     mins  22314;    Work Hardening           0      mins 74400  Iontophoresis               0   mins 45064  E-Stim                          _0_ mins 28269 ( )    Timed Treatment:   48   mins   Total Treatment:     48   mins    Blane Moura PTA  Physical Therapist Assistant  "

## 2021-05-12 ENCOUNTER — TREATMENT (OUTPATIENT)
Dept: PHYSICAL THERAPY | Facility: CLINIC | Age: 46
End: 2021-05-12

## 2021-05-12 DIAGNOSIS — S32.9XXD CLOSED NONDISPLACED FRACTURE OF PELVIS WITH ROUTINE HEALING, UNSPECIFIED PART OF PELVIS, SUBSEQUENT ENCOUNTER: Primary | ICD-10-CM

## 2021-05-12 DIAGNOSIS — S32.425D CLOSED NONDISPLACED FRACTURE OF POSTERIOR WALL OF LEFT ACETABULUM WITH ROUTINE HEALING, SUBSEQUENT ENCOUNTER: ICD-10-CM

## 2021-05-12 PROCEDURE — 97530 THERAPEUTIC ACTIVITIES: CPT | Performed by: PHYSICAL THERAPIST

## 2021-05-12 PROCEDURE — 97110 THERAPEUTIC EXERCISES: CPT | Performed by: PHYSICAL THERAPIST

## 2021-05-12 NOTE — PROGRESS NOTES
Physical Therapy Daily Progress Note      Visit # 20      Subjective Evaluation    History of Present Illness    Subjective comment: Pt reports that his low back is sore this morning.  Believes that he slept on it wrong.       Objective   See Exercise, Manual, and Modality Logs for complete treatment.   Added RDL    Assessment & Plan     Assessment  Assessment details: Pt completed treatment with increasing fatigue as treatment progressed.  Pt tolerated the addition of RDL's to facilitate increased hip strength.  Pt low back began to bother him at end of treatment causing sit to stands to be deferred.                     Manual Therapy:    0     mins  38200;  Therapeutic Exercise:    14     mins  90156;     Neuromuscular Dianelys:    0    mins  78499;    Therapeutic Activity:     28     mins  39248;     Gait Trainin     mins  82855;     Ultrasound:     0     mins  87313;    Work Hardening           0      mins 95288  Iontophoresis               0   mins 12312  E-Stim                          _0_ mins 01812 ( )    Timed Treatment:   42   mins   Total Treatment:     42   mins    Blane Moura PTA  Physical Therapist Assistant

## 2021-05-17 ENCOUNTER — TREATMENT (OUTPATIENT)
Dept: PHYSICAL THERAPY | Facility: CLINIC | Age: 46
End: 2021-05-17

## 2021-05-17 DIAGNOSIS — S32.425D CLOSED NONDISPLACED FRACTURE OF POSTERIOR WALL OF LEFT ACETABULUM WITH ROUTINE HEALING, SUBSEQUENT ENCOUNTER: ICD-10-CM

## 2021-05-17 DIAGNOSIS — S32.9XXD CLOSED NONDISPLACED FRACTURE OF PELVIS WITH ROUTINE HEALING, UNSPECIFIED PART OF PELVIS, SUBSEQUENT ENCOUNTER: Primary | ICD-10-CM

## 2021-05-17 PROCEDURE — 97110 THERAPEUTIC EXERCISES: CPT | Performed by: PHYSICAL THERAPIST

## 2021-05-17 PROCEDURE — 97116 GAIT TRAINING THERAPY: CPT | Performed by: PHYSICAL THERAPIST

## 2021-05-17 NOTE — PROGRESS NOTES
Physical Therapy Daily Progress Note      Visit # 21      Subjective Evaluation    History of Present Illness    Subjective comment: Pt reports having no pain or soreness today in (L) hip.       Objective   See Exercise, Manual, and Modality Logs for complete treatment.       Assessment & Plan     Assessment  Assessment details: Pt completed treatment with no provocation of pain in (L) hip.  Pt did c/o soreness in lumbar after RDL's.  Plan to progress as tolerated.                     Manual Therapy:    0     mins  07314;  Therapeutic Exercise:    13     mins  79814;     Neuromuscular Dianelys:    0    mins  72759;    Therapeutic Activity:     0     mins  77249;     Gait Trainin     mins  38015;     Ultrasound:     0     mins  45160;    Work Hardening           0      mins 62431  Iontophoresis               0   mins 39213  E-Stim                          _0_ mins 66048 ( )    Timed Treatment:   43   mins   Total Treatment:     43   mins    Blane Moura PTA  Physical Therapist Assistant

## 2021-05-19 ENCOUNTER — TREATMENT (OUTPATIENT)
Dept: PHYSICAL THERAPY | Facility: CLINIC | Age: 46
End: 2021-05-19

## 2021-05-19 DIAGNOSIS — S32.9XXD CLOSED NONDISPLACED FRACTURE OF PELVIS WITH ROUTINE HEALING, UNSPECIFIED PART OF PELVIS, SUBSEQUENT ENCOUNTER: Primary | ICD-10-CM

## 2021-05-19 DIAGNOSIS — S32.425D CLOSED NONDISPLACED FRACTURE OF POSTERIOR WALL OF LEFT ACETABULUM WITH ROUTINE HEALING, SUBSEQUENT ENCOUNTER: ICD-10-CM

## 2021-05-19 PROCEDURE — 97530 THERAPEUTIC ACTIVITIES: CPT | Performed by: PHYSICAL THERAPIST

## 2021-05-19 PROCEDURE — 97110 THERAPEUTIC EXERCISES: CPT | Performed by: PHYSICAL THERAPIST

## 2021-05-19 NOTE — PROGRESS NOTES
Physical Therapy Daily Progress Note      Visit # 22      Subjective Evaluation    History of Present Illness    Subjective comment: Pt reports that his hip is doing fine.       Objective   See Exercise, Manual, and Modality Logs for complete treatment.       Assessment & Plan     Assessment  Assessment details: Pt completed treatment with visible signs of fatigue, but no c/o pain.  He needs minimal vc to work through his prescribed exercises.  Continue to progress as tolerated.                     Manual Therapy:    0     mins  05070;  Therapeutic Exercise:    18     mins  73559;     Neuromuscular Dianelys:    0    mins  39401;    Therapeutic Activity:     32     mins  46915;     Gait Trainin     mins  03338;     Ultrasound:     0     mins  74382;    Work Hardening           0      mins 77478  Iontophoresis               0   mins 82927  E-Stim                          _0_ mins 07536 ( )    Timed Treatment:   50   mins   Total Treatment:     50   mins    Blane Moura PTA  Physical Therapist Assistant

## 2021-05-24 ENCOUNTER — DOCUMENTATION (OUTPATIENT)
Dept: PHYSICAL THERAPY | Facility: CLINIC | Age: 46
End: 2021-05-24

## 2021-05-24 ENCOUNTER — TREATMENT (OUTPATIENT)
Dept: PHYSICAL THERAPY | Facility: CLINIC | Age: 46
End: 2021-05-24

## 2021-05-24 DIAGNOSIS — S32.9XXD CLOSED NONDISPLACED FRACTURE OF PELVIS WITH ROUTINE HEALING, UNSPECIFIED PART OF PELVIS, SUBSEQUENT ENCOUNTER: Primary | ICD-10-CM

## 2021-05-24 PROCEDURE — 97110 THERAPEUTIC EXERCISES: CPT | Performed by: PHYSICAL THERAPIST

## 2021-05-24 PROCEDURE — 97530 THERAPEUTIC ACTIVITIES: CPT | Performed by: PHYSICAL THERAPIST

## 2021-05-24 NOTE — PROGRESS NOTES
Physical Therapy Daily Progress Note      Subjective  Patient reports no pain in the hip.    Objective   See Exercise, Manual, and Modality Logs for complete treatment.       Assessment/Plan  Subjective reports continue to be good.  Patient performed the routine without a significant increase in pain.  Patient tolerated the increase in step height very well, no reports of pain with it.  Good proprioception on the left LE.                   Manual Therapy:    0     mins  29253;  Therapeutic Exercise:    23     mins  89373;     Neuromuscular Dianelys:    0    mins  88311;    Therapeutic Activity:     11     mins  91301;     Gait Trainin     mins  80113;     Ultrasound:     0     mins  57071;    Work Hardening           0      mins 75976  Iontophoresis               0   mins 19288    Timed Treatment:   34   mins   Total Treatment:     34   mins    Flynn Marquez, PT  Physical Therapist